# Patient Record
Sex: FEMALE | Race: WHITE
[De-identification: names, ages, dates, MRNs, and addresses within clinical notes are randomized per-mention and may not be internally consistent; named-entity substitution may affect disease eponyms.]

---

## 2018-03-09 DIAGNOSIS — E28.2 PCOS (POLYCYSTIC OVARIAN SYNDROME): Primary | ICD-10-CM

## 2018-03-09 RX ORDER — METFORMIN HYDROCHLORIDE 500 MG/1
500 TABLET, EXTENDED RELEASE ORAL 2 TIMES DAILY WITH MEALS
Qty: 60 TABLET | Refills: 0 | Status: SHIPPED | OUTPATIENT
Start: 2018-03-09 | End: 2018-04-04 | Stop reason: SDUPTHER

## 2018-04-04 DIAGNOSIS — E28.2 PCOS (POLYCYSTIC OVARIAN SYNDROME): ICD-10-CM

## 2018-04-04 RX ORDER — METFORMIN HYDROCHLORIDE 500 MG/1
TABLET, EXTENDED RELEASE ORAL
Qty: 60 TABLET | Refills: 0 | Status: SHIPPED | OUTPATIENT
Start: 2018-04-04 | End: 2018-04-05 | Stop reason: SDUPTHER

## 2018-04-05 DIAGNOSIS — E28.2 PCOS (POLYCYSTIC OVARIAN SYNDROME): ICD-10-CM

## 2018-04-05 RX ORDER — METFORMIN HYDROCHLORIDE 500 MG/1
500 TABLET, EXTENDED RELEASE ORAL 2 TIMES DAILY WITH MEALS
Qty: 60 TABLET | Refills: 0 | Status: SHIPPED | OUTPATIENT
Start: 2018-04-05 | End: 2018-05-04 | Stop reason: SDUPTHER

## 2018-05-04 DIAGNOSIS — E28.2 PCOS (POLYCYSTIC OVARIAN SYNDROME): ICD-10-CM

## 2018-05-04 RX ORDER — METFORMIN HYDROCHLORIDE 500 MG/1
500 TABLET, EXTENDED RELEASE ORAL 2 TIMES DAILY WITH MEALS
Qty: 180 TABLET | Refills: 3 | Status: SHIPPED | OUTPATIENT
Start: 2018-05-04 | End: 2019-01-07 | Stop reason: SDUPTHER

## 2018-05-04 NOTE — TELEPHONE ENCOUNTER
Previous Brianna pt has appt with you in January  She is asking if she can have her metformin with refills to last her till her appt?

## 2018-08-06 ENCOUNTER — TRANSCRIBE ORDERS (OUTPATIENT)
Dept: SCHEDULING | Age: 33
End: 2018-08-06

## 2018-08-06 DIAGNOSIS — R19.7 DIARRHEA: Primary | ICD-10-CM

## 2018-08-17 ENCOUNTER — HOSPITAL ENCOUNTER (OUTPATIENT)
Dept: RADIOLOGY | Age: 33
Discharge: HOME | End: 2018-08-17
Attending: PHYSICIAN ASSISTANT
Payer: COMMERCIAL

## 2018-08-17 DIAGNOSIS — R19.7 DIARRHEA: ICD-10-CM

## 2018-08-17 PROCEDURE — 76700 US EXAM ABDOM COMPLETE: CPT

## 2018-09-14 ENCOUNTER — TELEPHONE (OUTPATIENT)
Dept: ENDOCRINOLOGY | Facility: HOSPITAL | Age: 33
End: 2018-09-14

## 2018-09-14 NOTE — TELEPHONE ENCOUNTER
Patient has been seeing gi for vomiting and diarrhea  They would like to stop her metformin to see if this helps, is that ok?  pls advise

## 2018-11-19 ENCOUNTER — TRANSCRIBE ORDERS (OUTPATIENT)
Dept: REGISTRATION | Age: 33
End: 2018-11-19

## 2018-11-19 DIAGNOSIS — K58.0 IRRITABLE BOWEL SYNDROME WITH DIARRHEA: ICD-10-CM

## 2018-11-19 DIAGNOSIS — R11.2 NAUSEA WITH VOMITING: Primary | ICD-10-CM

## 2018-12-19 ENCOUNTER — TRANSCRIBE ORDERS (OUTPATIENT)
Dept: SCHEDULING | Age: 33
End: 2018-12-19

## 2018-12-19 DIAGNOSIS — R11.2 NAUSEA WITH VOMITING: Primary | ICD-10-CM

## 2018-12-19 LAB
ALBUMIN SERPL-MCNC: 4.5 G/DL (ref 3.6–5.1)
ALBUMIN/GLOB SERPL: 1.8 (CALC) (ref 1–2.5)
ALP SERPL-CCNC: 78 U/L (ref 33–115)
ALT SERPL-CCNC: 24 U/L (ref 6–29)
AST SERPL-CCNC: 17 U/L (ref 10–30)
BILIRUB SERPL-MCNC: 0.4 MG/DL (ref 0.2–1.2)
BUN SERPL-MCNC: 9 MG/DL (ref 7–25)
BUN/CREAT SERPL: NORMAL (CALC) (ref 6–22)
CALCIUM SERPL-MCNC: 9.6 MG/DL (ref 8.6–10.2)
CHLORIDE SERPL-SCNC: 108 MMOL/L (ref 98–110)
CHOLEST SERPL-MCNC: 143 MG/DL
CHOLEST/HDLC SERPL: 4.8 (CALC)
CO2 SERPL-SCNC: 22 MMOL/L (ref 20–32)
CREAT SERPL-MCNC: 0.86 MG/DL (ref 0.5–1.1)
GLOBULIN SER CALC-MCNC: 2.5 G/DL (CALC) (ref 1.9–3.7)
GLUCOSE SERPL-MCNC: 96 MG/DL (ref 65–99)
HBA1C MFR BLD: 5.2 % OF TOTAL HGB
HDLC SERPL-MCNC: 30 MG/DL
INSULIN SERPL-ACNC: 28 UIU/ML (ref 2–19.6)
LDLC SERPL CALC-MCNC: 75 MG/DL (CALC)
NONHDLC SERPL-MCNC: 113 MG/DL (CALC)
POTASSIUM SERPL-SCNC: 4.1 MMOL/L (ref 3.5–5.3)
PROT SERPL-MCNC: 7 G/DL (ref 6.1–8.1)
SL AMB EGFR AFRICAN AMERICAN: 103 ML/MIN/1.73M2
SL AMB EGFR NON AFRICAN AMERICAN: 89 ML/MIN/1.73M2
SODIUM SERPL-SCNC: 141 MMOL/L (ref 135–146)
T4 FREE SERPL-MCNC: 1.2 NG/DL (ref 0.8–1.8)
TESTOST FREE SERPL-MCNC: 3.5 PG/ML
TESTOST SERPL-MCNC: 23 NG/DL
TRIGL SERPL-MCNC: 289 MG/DL
TSH SERPL-ACNC: 2.33 MIU/L

## 2018-12-24 ENCOUNTER — HOSPITAL ENCOUNTER (OUTPATIENT)
Dept: RADIOLOGY | Age: 33
Discharge: HOME | End: 2018-12-24
Attending: PHYSICIAN ASSISTANT
Payer: COMMERCIAL

## 2018-12-24 ENCOUNTER — TRANSCRIBE ORDERS (OUTPATIENT)
Dept: REGISTRATION | Age: 33
End: 2018-12-24

## 2018-12-24 DIAGNOSIS — R11.2 NAUSEA WITH VOMITING: ICD-10-CM

## 2018-12-24 DIAGNOSIS — R11.2 NAUSEA WITH VOMITING: Primary | ICD-10-CM

## 2018-12-24 PROCEDURE — 63600105 HC IODINE BASED CONTRAST: Performed by: PHYSICIAN ASSISTANT

## 2018-12-24 PROCEDURE — 70470 CT HEAD/BRAIN W/O & W/DYE: CPT

## 2018-12-24 RX ADMIN — IOHEXOL 100 ML: 350 INJECTION, SOLUTION INTRAVENOUS at 10:37

## 2019-01-07 ENCOUNTER — OFFICE VISIT (OUTPATIENT)
Dept: ENDOCRINOLOGY | Facility: HOSPITAL | Age: 34
End: 2019-01-07
Payer: COMMERCIAL

## 2019-01-07 VITALS
WEIGHT: 238.8 LBS | HEIGHT: 67 IN | HEART RATE: 80 BPM | BODY MASS INDEX: 37.48 KG/M2 | DIASTOLIC BLOOD PRESSURE: 86 MMHG | SYSTOLIC BLOOD PRESSURE: 138 MMHG

## 2019-01-07 DIAGNOSIS — L68.0 HIRSUTISM: ICD-10-CM

## 2019-01-07 DIAGNOSIS — E78.1 HYPERTRIGLYCERIDEMIA: ICD-10-CM

## 2019-01-07 DIAGNOSIS — E28.2 PCOS (POLYCYSTIC OVARIAN SYNDROME): Primary | ICD-10-CM

## 2019-01-07 DIAGNOSIS — E66.09 CLASS 2 OBESITY DUE TO EXCESS CALORIES WITHOUT SERIOUS COMORBIDITY WITH BODY MASS INDEX (BMI) OF 37.0 TO 37.9 IN ADULT: ICD-10-CM

## 2019-01-07 PROBLEM — E66.812 CLASS 2 OBESITY DUE TO EXCESS CALORIES WITHOUT SERIOUS COMORBIDITY WITH BODY MASS INDEX (BMI) OF 37.0 TO 37.9 IN ADULT: Status: ACTIVE | Noted: 2019-01-07

## 2019-01-07 PROBLEM — L70.8 OTHER ACNE: Status: ACTIVE | Noted: 2019-01-07

## 2019-01-07 PROCEDURE — 99214 OFFICE O/P EST MOD 30 MIN: CPT | Performed by: INTERNAL MEDICINE

## 2019-01-07 RX ORDER — OXCARBAZEPINE 300 MG/1
300 TABLET, FILM COATED ORAL
COMMUNITY
End: 2019-07-08 | Stop reason: ALTCHOICE

## 2019-01-07 RX ORDER — ALUMINUM ZIRCONIUM OCTACHLOROHYDREX GLY 16 G/100G
1 GEL TOPICAL DAILY
COMMUNITY
End: 2020-07-13 | Stop reason: ALTCHOICE

## 2019-01-07 RX ORDER — ASCORBIC ACID 125 MG
2 TABLET,CHEWABLE ORAL DAILY
COMMUNITY
End: 2020-07-13 | Stop reason: ALTCHOICE

## 2019-01-07 RX ORDER — RANITIDINE 300 MG/1
300 CAPSULE ORAL
COMMUNITY
End: 2020-07-13 | Stop reason: ALTCHOICE

## 2019-01-07 RX ORDER — CARIPRAZINE 6 MG/1
6 CAPSULE, GELATIN COATED ORAL DAILY
COMMUNITY
Start: 2019-01-02

## 2019-01-07 RX ORDER — PROPRANOLOL HYDROCHLORIDE 10 MG/1
10 TABLET ORAL AS NEEDED
COMMUNITY

## 2019-01-07 RX ORDER — ASCORBIC ACID 1000 MG
2 TABLET ORAL
COMMUNITY
End: 2019-07-08 | Stop reason: ALTCHOICE

## 2019-01-07 RX ORDER — DEXLANSOPRAZOLE 60 MG/1
60 CAPSULE, DELAYED RELEASE ORAL
COMMUNITY
End: 2021-09-27 | Stop reason: ALTCHOICE

## 2019-01-07 RX ORDER — LITHIUM CARBONATE 450 MG
450 TABLET, EXTENDED RELEASE ORAL
COMMUNITY

## 2019-01-07 RX ORDER — METFORMIN HYDROCHLORIDE 500 MG/1
500 TABLET, EXTENDED RELEASE ORAL 3 TIMES DAILY
Qty: 270 TABLET | Refills: 3 | Status: SHIPPED | OUTPATIENT
Start: 2019-01-07 | End: 2019-07-08 | Stop reason: SDUPTHER

## 2019-01-07 RX ORDER — LORAZEPAM 1 MG/1
1 TABLET ORAL
COMMUNITY

## 2019-01-07 NOTE — PATIENT INSTRUCTIONS
Given the high insulin level, you can increase the 3 metformin XR 5000 mg daily  If the stomach symptoms worsen, go back down to 2 tablets daily  Continue to exercise  Work on cutting down the sweets and sugars and carbohydrates/starches  Consider a sleep study evaluation  Follow up in 6 months with blood work

## 2019-01-07 NOTE — PROGRESS NOTES
1/7/2019    Assessment/Plan      Diagnoses and all orders for this visit:    PCOS (polycystic ovarian syndrome)  -     Comprehensive metabolic panel Lab Collect; Future  -     Insulin, fasting; Future  -     metFORMIN (GLUCOPHAGE-XR) 500 mg 24 hr tablet; Take 1 tablet (500 mg total) by mouth 3 (three) times a day    Hirsutism  -     Comprehensive metabolic panel Lab Collect; Future  -     Insulin, fasting; Future    Class 2 obesity due to excess calories without serious comorbidity with body mass index (BMI) of 37 0 to 37 9 in adult    Hypertriglyceridemia    Other orders  -     dexlansoprazole (DEXILANT) 60 MG capsule; Take 60 mg by mouth  -     lithium carbonate (LITHOBID) 450 mg CR tablet; Take 450 mg by mouth 2 (two) times a day    -     VRAYLAR 6 MG capsule; 6 mg daily    -     OXcarbazepine (TRILEPTAL) 300 mg tablet; Take 300 mg by mouth  -     ranitidine (ZANTAC) 300 MG capsule; Take 300 mg by mouth  -     LORazepam (ATIVAN) 1 mg tablet; Take 1 mg by mouth  -     propranolol (INDERAL) 10 mg tablet; Take 10 mg by mouth 3 (three) times a day Prn anxiety disorder   -     Multiple Vitamins-Minerals (MULTI ADULT GUMMIES) CHEW; Chew 2 each daily  -     Charcoal 200 MG CAPS; Take 2 each by mouth daily at bedtime  -     bifidobacterium infantis (ALIGN) capsule; Take 1 capsule by mouth daily        Assessment/Plan:  1  Polycystic ovary syndrome  Based on her blood work, she has had an elevated insulin level  I will increase her metformin  mg to 3 tablets daily  She was warned that could increase her diarrhea and GI upset  If her GI symptoms worsen, she has to go back down to 2 tablets daily  Hopefully this will also help her lose weight  2   Obesity  Her weight has gone up since last year  She has just started exercising over the last week  I educated her and strongly encouraged her to cut out the sweets and sugared foods and to control her carbohydrate portions to help her lose weight    3  Hypertriglyceridemia  Her triglycerides are quite high with a low HDL which is consistent with polycystic ovary syndrome and insulin resistance along with metabolic syndrome  Losing weight cutting out the sweets may help these triglycerides  I have asked her to follow up in 6 months with fasting CMP and insulin level  CC:   Polycystic ovary syndrome follow-up    History of Present Illness     HPI: Harrison Pascual is a 35y o  year old female with history of polycystic ovary syndrome with hirsutism acne  She was 1st diagnosed with polycystic ovary syndrome about 4 or more years ago  She is currently on metformin  mg 2 tablets daily  She was on 3 tablets daily 1 year ago and it is unclear when that dose was decreased  She did try going off her metformin for at least a week and there was no change in her current diarrhea or vomiting and she has since been diagnosed with gastric paresis  She was also on spironolactone 1 year ago for hirsutism but has not been using it since her hirsutism is not too bad  She is though experiencing some hair loss and thinning on top of her head  Her acne is stable  Menstrual cycles do occur on a regular monthly basis and she is only skipped 1 menses within the last 6 months  Unfortunately, weight has gone up over 10 lb since last year  She just started exercising again over the last week and admits that she is a sweet eater  She has no polyuria polyphagia or nocturia but has some polydipsia with her medications  She has no extremity numbness or tingling or blurry vision  Review of Systems   Constitutional: Positive for fatigue and unexpected weight change  Says she will going back to weight and workout at home just started to lose weight last week  No so good with limiting sugars and sweets  She is better with no late night eating past 6 pm  She reports 226 lbs 4 weeks ago  HENT: Negative for hearing loss, tinnitus and trouble swallowing      Eyes: Negative for visual disturbance  Respiratory: Negative for chest tightness and shortness of breath  Cardiovascular: Positive for palpitations  Negative for chest pain and leg swelling  Occasional heart palpitations  Gastrointestinal: Positive for diarrhea  Negative for abdominal pain, constipation and nausea  Vomiting and diarrhea  No change off metformin for 1 week so back on  Diagnosed with gatric paresis  Endocrine: Positive for polydipsia  Negative for polyphagia and polyuria  A lot water due to thirst and health  No nocturia  Genitourinary:        Menses regular on a monthly basis  Skipped 1 within the last 6 months  Flow heavy  Musculoskeletal: Positive for myalgias  Negative for arthralgias and back pain  Skin: Negative for wound  Hirsutism rare  Losing more hair on head recently  a few months ago  She stopped spironalactone about 1 year ago  No significant acne  Neurological: Positive for dizziness  Negative for tremors, light-headedness, numbness and headaches  Very dizzy when bend over and get back up    Psychiatric/Behavioral: Positive for dysphoric mood  Negative for sleep disturbance  The patient is nervous/anxious  Mother reports she snores loudly  Bipolar/anxiety stable  Grandmother  this year         Historical Information   Past Medical History:   Diagnosis Date    Asthma     childhood    Bipolar 1 disorder (HCC)     Fibromyalgia     Gastric paresis     Generalized anxiety disorder     GERD (gastroesophageal reflux disease)     Hypertriglyceridemia     low HDL    OCD (obsessive compulsive disorder)      Past Surgical History:   Procedure Laterality Date    SINUS SURGERY      WISDOM TOOTH EXTRACTION       Social History   History   Alcohol Use No     History   Drug Use No     History   Smoking Status    Never Smoker   Smokeless Tobacco    Never Used     Family History:   Family History   Problem Relation Age of Onset    No Known Problems Mother     Lymphoma Father         NON HODGEKINS       Meds/Allergies   Current Outpatient Prescriptions   Medication Sig Dispense Refill    bifidobacterium infantis (ALIGN) capsule Take 1 capsule by mouth daily      Charcoal 200 MG CAPS Take 2 each by mouth daily at bedtime      dexlansoprazole (DEXILANT) 60 MG capsule Take 60 mg by mouth      lithium carbonate (LITHOBID) 450 mg CR tablet Take 450 mg by mouth 2 (two) times a day        LORazepam (ATIVAN) 1 mg tablet Take 1 mg by mouth      metFORMIN (GLUCOPHAGE-XR) 500 mg 24 hr tablet Take 1 tablet (500 mg total) by mouth 3 (three) times a day 270 tablet 3    Multiple Vitamins-Minerals (MULTI ADULT GUMMIES) CHEW Chew 2 each daily      OXcarbazepine (TRILEPTAL) 300 mg tablet Take 300 mg by mouth      propranolol (INDERAL) 10 mg tablet Take 10 mg by mouth 3 (three) times a day Prn anxiety disorder       ranitidine (ZANTAC) 300 MG capsule Take 300 mg by mouth      VRAYLAR 6 MG capsule 6 mg daily         No current facility-administered medications for this visit  No Known Allergies    Objective   Vitals: Blood pressure 138/86, pulse 80, height 5' 7" (1 702 m), weight 108 kg (238 lb 12 8 oz)  Invasive Devices          No matching active lines, drains, or airways          Physical Exam   Constitutional: She is oriented to person, place, and time  She appears well-developed and well-nourished  HENT:   Head: Normocephalic and atraumatic  No moon faces  Eyes: Pupils are equal, round, and reactive to light  Conjunctivae and EOM are normal    No lid lag, stare, proptosis, or periorbital edema  Neck: Normal range of motion  Neck supple  No thyromegaly present  Thyroid normal in size  No palpable thyroid nodules  No carotid bruits  No supraclavicular fat pads or buffalo hump  Cardiovascular: Normal rate, regular rhythm, normal heart sounds and intact distal pulses  No murmur heard    Pulmonary/Chest: Effort normal and breath sounds normal  She has no wheezes  Abdominal: Soft  Bowel sounds are normal  There is no tenderness  Musculoskeletal: Normal range of motion  She exhibits no edema or deformity  No tremor of the outstretched hands  No spinous process tenderness  No CVA tenderness  Lymphadenopathy:     She has no cervical adenopathy  Neurological: She is alert and oriented to person, place, and time  She has normal reflexes  Skin: Skin is warm and dry  No rash noted  No significant hirsutism  Some acne noted  Some visible thinning on the vertex of her head  Vitals reviewed  The history was obtained from the review of the chart and from the patient and mother  Lab Results:    Hemoglobin A1c was normal at 5 2%  CMP shows a fasting glucose of 96 with a BUN of 9 and creatinine of 0 86  The rest of the CMP was normal  Insulin level fasting was elevated at twenty-eight  Lab Results   Component Value Date    BUN 9 12/19/2018    K 4 1 12/19/2018     12/19/2018    CO2 22 12/19/2018     Total cholesterol 143, LDL 75   Lab Results   Component Value Date    HDL 30 (L) 12/19/2018    TRIG 289 (H) 12/19/2018       Lab Results   Component Value Date    ALKPHOS 78 12/19/2018       Lab Results   Component Value Date    TSH 2 33 12/19/2018    FREET4 1 2 12/19/2018    Total Testosterone of 23      Future Appointments  Date Time Provider Colette Clark   7/8/2019 1:00 PM Nadya Alcantar MD [facility]

## 2019-01-07 NOTE — LETTER
January 7, 2019     [facility]  [address]  [address] [place] [id number]    Patient: Yehuda Mcgarry   YOB: 1985   Date of Visit: 1/7/2019       Dear Dr Bianca Hope: Thank you for referring Yehuda Mcgarry to me for evaluation  Below are my notes for this consultation  If you have questions, please do not hesitate to call me  I look forward to following your patient along with you  Sincerely,        Jayme Mcknight MD        CC: No Recipients  Jayme Mcknight MD  1/7/2019  2:12 PM  Sign at close encounter  1/7/2019    Assessment/Plan      Diagnoses and all orders for this visit:    PCOS (polycystic ovarian syndrome)  -     Comprehensive metabolic panel Lab Collect; Future  -     Insulin, fasting; Future  -     metFORMIN (GLUCOPHAGE-XR) 500 mg 24 hr tablet; Take 1 tablet (500 mg total) by mouth 3 (three) times a day    Hirsutism  -     Comprehensive metabolic panel Lab Collect; Future  -     Insulin, fasting; Future    Class 2 obesity due to excess calories without serious comorbidity with body mass index (BMI) of 37 0 to 37 9 in adult    Hypertriglyceridemia    Other orders  -     dexlansoprazole (DEXILANT) 60 MG capsule; Take 60 mg by mouth  -     lithium carbonate (LITHOBID) 450 mg CR tablet; Take 450 mg by mouth 2 (two) times a day    -     VRAYLAR 6 MG capsule; 6 mg daily    -     OXcarbazepine (TRILEPTAL) 300 mg tablet; Take 300 mg by mouth  -     ranitidine (ZANTAC) 300 MG capsule; Take 300 mg by mouth  -     LORazepam (ATIVAN) 1 mg tablet; Take 1 mg by mouth  -     propranolol (INDERAL) 10 mg tablet; Take 10 mg by mouth 3 (three) times a day Prn anxiety disorder   -     Multiple Vitamins-Minerals (MULTI ADULT GUMMIES) CHEW; Chew 2 each daily  -     Charcoal 200 MG CAPS; Take 2 each by mouth daily at bedtime  -     bifidobacterium infantis (ALIGN) capsule; Take 1 capsule by mouth daily        Assessment/Plan:  1  Polycystic ovary syndrome    Based on her blood work, she has had an elevated insulin level  I will increase her metformin  mg to 3 tablets daily  She was warned that could increase her diarrhea and GI upset  If her GI symptoms worsen, she has to go back down to 2 tablets daily  Hopefully this will also help her lose weight  2   Obesity  Her weight has gone up since last year  She has just started exercising over the last week  I educated her and strongly encouraged her to cut out the sweets and sugared foods and to control her carbohydrate portions to help her lose weight  3   Hypertriglyceridemia  Her triglycerides are quite high with a low HDL which is consistent with polycystic ovary syndrome and insulin resistance along with metabolic syndrome  Losing weight cutting out the sweets may help these triglycerides  I have asked her to follow up in 6 months with fasting CMP and insulin level  CC:   Polycystic ovary syndrome follow-up    History of Present Illness     HPI: Dakotah Mroley is a 35y o  year old female with history of polycystic ovary syndrome with hirsutism acne  She was 1st diagnosed with polycystic ovary syndrome about 4 or more years ago  She is currently on metformin  mg 2 tablets daily  She was on 3 tablets daily 1 year ago and it is unclear when that dose was decreased  She did try going off her metformin for at least a week and there was no change in her current diarrhea or vomiting and she has since been diagnosed with gastric paresis  She was also on spironolactone 1 year ago for hirsutism but has not been using it since her hirsutism is not too bad  She is though experiencing some hair loss and thinning on top of her head  Her acne is stable  Menstrual cycles do occur on a regular monthly basis and she is only skipped 1 menses within the last 6 months  Unfortunately, weight has gone up over 10 lb since last year  She just started exercising again over the last week and admits that she is a sweet eater  She has no polyuria polyphagia or nocturia but has some polydipsia with her medications  She has no extremity numbness or tingling or blurry vision  Review of Systems   Constitutional: Positive for fatigue and unexpected weight change  Says she will going back to weight and workout at home just started to lose weight last week  No so good with limiting sugars and sweets  She is better with no late night eating past 6 pm  She reports 226 lbs 4 weeks ago  HENT: Negative for hearing loss, tinnitus and trouble swallowing  Eyes: Negative for visual disturbance  Respiratory: Negative for chest tightness and shortness of breath  Cardiovascular: Positive for palpitations  Negative for chest pain and leg swelling  Occasional heart palpitations  Gastrointestinal: Positive for diarrhea  Negative for abdominal pain, constipation and nausea  Vomiting and diarrhea  No change off metformin for 1 week so back on  Diagnosed with gatric paresis  Endocrine: Positive for polydipsia  Negative for polyphagia and polyuria  A lot water due to thirst and health  No nocturia  Genitourinary:        Menses regular on a monthly basis  Skipped 1 within the last 6 months  Flow heavy  Musculoskeletal: Positive for myalgias  Negative for arthralgias and back pain  Skin: Negative for wound  Hirsutism rare  Losing more hair on head recently  a few months ago  She stopped spironalactone about 1 year ago  No significant acne  Neurological: Positive for dizziness  Negative for tremors, light-headedness, numbness and headaches  Very dizzy when bend over and get back up    Psychiatric/Behavioral: Positive for dysphoric mood  Negative for sleep disturbance  The patient is nervous/anxious  Mother reports she snores loudly  Bipolar/anxiety stable  Grandmother  this year         Historical Information   Past Medical History:   Diagnosis Date    Asthma     childhood    Bipolar 1 disorder ([facility] [address] )     Fibromyalgia     Gastric paresis     Generalized anxiety disorder     GERD (gastroesophageal reflux disease)     Hypertriglyceridemia     low HDL    OCD (obsessive compulsive disorder)      Past Surgical History:   Procedure Laterality Date    SINUS SURGERY      WISDOM TOOTH EXTRACTION       Social History   History   Alcohol Use No     History   Drug Use No     History   Smoking Status    Never Smoker   Smokeless Tobacco    Never Used     Family History:   Family History   Problem Relation Age of Onset    No Known Problems Mother     Lymphoma Father         NON HODGEKINS       Meds/Allergies   Current Outpatient Prescriptions   Medication Sig Dispense Refill    bifidobacterium infantis (ALIGN) capsule Take 1 capsule by mouth daily      Charcoal 200 MG CAPS Take 2 each by mouth daily at bedtime      dexlansoprazole (DEXILANT) 60 MG capsule Take 60 mg by mouth      lithium carbonate (LITHOBID) 450 mg CR tablet Take 450 mg by mouth 2 (two) times a day        LORazepam (ATIVAN) 1 mg tablet Take 1 mg by mouth      metFORMIN (GLUCOPHAGE-XR) 500 mg 24 hr tablet Take 1 tablet (500 mg total) by mouth 3 (three) times a day 270 tablet 3    Multiple Vitamins-Minerals (MULTI ADULT GUMMIES) CHEW Chew 2 each daily      OXcarbazepine (TRILEPTAL) 300 mg tablet Take 300 mg by mouth      propranolol (INDERAL) 10 mg tablet Take 10 mg by mouth 3 (three) times a day Prn anxiety disorder       ranitidine (ZANTAC) 300 MG capsule Take 300 mg by mouth      VRAYLAR 6 MG capsule 6 mg daily         No current facility-administered medications for this visit  No Known Allergies    Objective   Vitals: Blood pressure 138/86, pulse 80, height 5' 7" (1 702 m), weight 108 kg (238 lb 12 8 oz)  Invasive Devices          No matching active lines, drains, or airways          Physical Exam   Constitutional: She is oriented to person, place, and time  She appears well-developed and well-nourished     HENT: Head: Normocephalic and atraumatic  No moon faces  Eyes: Pupils are equal, round, and reactive to light  Conjunctivae and EOM are normal    No lid lag, stare, proptosis, or periorbital edema  Neck: Normal range of motion  Neck supple  No thyromegaly present  Thyroid normal in size  No palpable thyroid nodules  No carotid bruits  No supraclavicular fat pads or buffalo hump  Cardiovascular: Normal rate, regular rhythm, normal heart sounds and intact distal pulses  No murmur heard  Pulmonary/Chest: Effort normal and breath sounds normal  She has no wheezes  Abdominal: Soft  Bowel sounds are normal  There is no tenderness  Musculoskeletal: Normal range of motion  She exhibits no edema or deformity  No tremor of the outstretched hands  No spinous process tenderness  No CVA tenderness  Lymphadenopathy:     She has no cervical adenopathy  Neurological: She is alert and oriented to person, place, and time  She has normal reflexes  Skin: Skin is warm and dry  No rash noted  No significant hirsutism  Some acne noted  Some visible thinning on the vertex of her head  Vitals reviewed  The history was obtained from the review of the chart and from the patient and mother  Lab Results:    Hemoglobin A1c was normal at 5 2%  CMP shows a fasting glucose of 96 with a BUN of 9 and creatinine of 0 86  The rest of the CMP was normal  Insulin level fasting was elevated at twenty-eight  Lab Results   Component Value Date    BUN 9 12/19/2018    K 4 1 12/19/2018     12/19/2018    CO2 22 12/19/2018     Total cholesterol 143, LDL 75   Lab Results   Component Value Date    HDL 30 (L) 12/19/2018    TRIG 289 (H) 12/19/2018       Lab Results   Component Value Date    ALKPHOS 78 12/19/2018       Lab Results   Component Value Date    TSH 2 33 12/19/2018    FREET4 1 2 12/19/2018    Total Testosterone of 23      Future Appointments  Date Time Provider Colette Clark   7/8/2019 1:00 PM Ildefonso Collins Meliza Smith, [address] [facility]

## 2019-06-12 LAB
ALBUMIN SERPL-MCNC: 4.6 G/DL (ref 3.6–5.1)
ALBUMIN/GLOB SERPL: 1.9 (CALC) (ref 1–2.5)
ALP SERPL-CCNC: 78 U/L (ref 33–115)
ALT SERPL-CCNC: 24 U/L (ref 6–29)
AST SERPL-CCNC: 15 U/L (ref 10–30)
BILIRUB SERPL-MCNC: 0.3 MG/DL (ref 0.2–1.2)
BUN SERPL-MCNC: 11 MG/DL (ref 7–25)
BUN/CREAT SERPL: NORMAL (CALC) (ref 6–22)
CALCIUM SERPL-MCNC: 9.4 MG/DL (ref 8.6–10.2)
CHLORIDE SERPL-SCNC: 106 MMOL/L (ref 98–110)
CO2 SERPL-SCNC: 23 MMOL/L (ref 20–32)
CREAT SERPL-MCNC: 0.99 MG/DL (ref 0.5–1.1)
GLOBULIN SER CALC-MCNC: 2.4 G/DL (CALC) (ref 1.9–3.7)
GLUCOSE SERPL-MCNC: 89 MG/DL (ref 65–99)
INSULIN SERPL-ACNC: 25.6 UIU/ML (ref 2–19.6)
INSULIN SERPL-ACNC: 26.5 UIU/ML (ref 2–19.6)
POTASSIUM SERPL-SCNC: 4.3 MMOL/L (ref 3.5–5.3)
PROT SERPL-MCNC: 7 G/DL (ref 6.1–8.1)
SL AMB EGFR AFRICAN AMERICAN: 87 ML/MIN/1.73M2
SL AMB EGFR NON AFRICAN AMERICAN: 75 ML/MIN/1.73M2
SODIUM SERPL-SCNC: 140 MMOL/L (ref 135–146)

## 2019-07-08 ENCOUNTER — OFFICE VISIT (OUTPATIENT)
Dept: ENDOCRINOLOGY | Facility: HOSPITAL | Age: 34
End: 2019-07-08
Payer: COMMERCIAL

## 2019-07-08 VITALS
HEIGHT: 67 IN | BODY MASS INDEX: 36.38 KG/M2 | HEART RATE: 84 BPM | WEIGHT: 231.8 LBS | SYSTOLIC BLOOD PRESSURE: 130 MMHG | DIASTOLIC BLOOD PRESSURE: 86 MMHG

## 2019-07-08 DIAGNOSIS — E66.09 CLASS 2 OBESITY DUE TO EXCESS CALORIES WITHOUT SERIOUS COMORBIDITY WITH BODY MASS INDEX (BMI) OF 37.0 TO 37.9 IN ADULT: ICD-10-CM

## 2019-07-08 DIAGNOSIS — L68.0 HIRSUTISM: ICD-10-CM

## 2019-07-08 DIAGNOSIS — E78.1 HYPERTRIGLYCERIDEMIA: ICD-10-CM

## 2019-07-08 DIAGNOSIS — E28.2 PCOS (POLYCYSTIC OVARIAN SYNDROME): Primary | ICD-10-CM

## 2019-07-08 DIAGNOSIS — L70.8 OTHER ACNE: ICD-10-CM

## 2019-07-08 PROCEDURE — 99214 OFFICE O/P EST MOD 30 MIN: CPT | Performed by: INTERNAL MEDICINE

## 2019-07-08 RX ORDER — METFORMIN HYDROCHLORIDE 500 MG/1
500 TABLET, EXTENDED RELEASE ORAL 3 TIMES DAILY
Qty: 270 TABLET | Refills: 3 | Status: SHIPPED | OUTPATIENT
Start: 2019-07-08 | End: 2020-07-13 | Stop reason: SDUPTHER

## 2019-07-08 RX ORDER — CYCLOBENZAPRINE HCL 10 MG
10 TABLET ORAL 3 TIMES DAILY PRN
COMMUNITY
End: 2020-07-13 | Stop reason: ALTCHOICE

## 2019-07-08 NOTE — PATIENT INSTRUCTIONS
Liver, kidney function and blood sugar normal   The insulin level is still high  Insulin level  can come down with weight loss  cetaphil foaming facial wash for acne prone skin  Continue the same twice daily metformin      Make sure to hold biotin for 1 week before blood tests  Follow up in 1 year with blood work

## 2019-07-08 NOTE — PROGRESS NOTES
7/8/2019    Assessment/Plan      Diagnoses and all orders for this visit:    PCOS (polycystic ovarian syndrome)  -     HEMOGLOBIN A1C W/ EAG ESTIMATION Lab Collect; Future  -     Comprehensive metabolic panel Lab Collect; Future  -     TSH, 3rd generation Lab Collect; Future  -     Testosterone, free, total Lab Collect; Future  -     Insulin, fasting- Lab Collect; Future  -     metFORMIN (GLUCOPHAGE-XR) 500 mg 24 hr tablet; Take 1 tablet (500 mg total) by mouth 3 (three) times a day    Hypertriglyceridemia  -     HEMOGLOBIN A1C W/ EAG ESTIMATION Lab Collect; Future  -     Comprehensive metabolic panel Lab Collect; Future  -     TSH, 3rd generation Lab Collect; Future  -     Testosterone, free, total Lab Collect; Future  -     Insulin, fasting- Lab Collect; Future    Hirsutism  -     HEMOGLOBIN A1C W/ EAG ESTIMATION Lab Collect; Future  -     Comprehensive metabolic panel Lab Collect; Future  -     TSH, 3rd generation Lab Collect; Future  -     Testosterone, free, total Lab Collect; Future  -     Insulin, fasting- Lab Collect; Future    Class 2 obesity due to excess calories without serious comorbidity with body mass index (BMI) of 37 0 to 37 9 in adult  -     HEMOGLOBIN A1C W/ EAG ESTIMATION Lab Collect; Future  -     Comprehensive metabolic panel Lab Collect; Future  -     TSH, 3rd generation Lab Collect; Future  -     Testosterone, free, total Lab Collect; Future  -     Insulin, fasting- Lab Collect; Future    Other acne  -     HEMOGLOBIN A1C W/ EAG ESTIMATION Lab Collect; Future  -     Comprehensive metabolic panel Lab Collect; Future  -     TSH, 3rd generation Lab Collect; Future  -     Testosterone, free, total Lab Collect; Future  -     Insulin, fasting- Lab Collect; Future    Other orders  -     cyclobenzaprine (FLEXERIL) 10 mg tablet; Take 10 mg by mouth 3 (three) times a day as needed for muscle spasms  -     Biotin 55732 MCG TBDP; Take by mouth daily        Assessment/Plan:  1  PCOS   Insulin level is still high  Liver, renal, and blood sugars are normal  She will continue the same metformin twice a day and can try increasing to 3 times a day as able  2  Hirsutism  She is not interested I treating this for now  3  Acne  She can try cetaphil foaming wash  4  Obesity  She will continue to work on diet, exercise, and weight loss  I have asked her to follow up in 1 year with preceding hgba1c, CMP, fasting insulin level, TSH, and free and total testosterone levels  CC: PCOS follow up    History of Present Illness     HPI: Clora Nissen is a 35y o  year old female with history of   Polycystic ovary syndrome with hypertriglyceridemia and hirsutism along with obesity  She was 1st diagnosed with polycystic ovary syndrome over 4 years ago  She is on metformin  She has been on spironolactone for hirsutism also in the past     She takes metformin  mg 2 tablets daily  Has not been able to tolerate 3 metformins daily, still needs antidiarrheal on 2 tablets daily  Has now been diagnosed with gastroparesis  She had a lot of am vomiting  She has some polydipsia but no polyuria  She has no blurry vision of extremity paresthesias  She has stable hirsutism and worsening cystic acne  Weight is 7 lbs more than last visit  Menses occur in a regular monthly basis but menses was skipped this past  Month  Review of Systems   Constitutional: Positive for fatigue  Negative for unexpected weight change  More fatigued with the fibromyalgia  7 lbs less than last visit  HENT: Negative for trouble swallowing  Eyes: Negative for visual disturbance  Wears glasses  Respiratory: Negative for chest tightness and shortness of breath  Cardiovascular: Negative for chest pain and palpitations  Gastrointestinal: Positive for abdominal pain, diarrhea and vomiting  Negative for constipation and nausea  Endocrine: Positive for polydipsia  Negative for polyuria  Always thirsty     Genitourinary: Menses regular on a monthly basis  Skipped menses this month  Musculoskeletal: Positive for myalgias  Fibromyalgia worse with weather  Skin:        Hirsutism stable  Cystic acne worse and jaw line skin fara and worse  Has been struggling with angular chelitis for 1 year  She is losing alot more  Thinning on top  She is trying biotin  Neurological: Positive for tremors  Negative for dizziness, light-headedness, numbness and headaches  Having left arm tremors  Psychiatric/Behavioral: Negative for dysphoric mood and sleep disturbance  The patient is nervous/anxious  Always a bit anxious         Historical Information   Past Medical History:   Diagnosis Date    Asthma     childhood    Bipolar 1 disorder (HCC)     Fibromyalgia     Gastric paresis     Gastroparesis     Generalized anxiety disorder     GERD (gastroesophageal reflux disease)     Hypertriglyceridemia     low HDL    OCD (obsessive compulsive disorder)      Past Surgical History:   Procedure Laterality Date    SINUS SURGERY      WISDOM TOOTH EXTRACTION       Social History   Social History     Substance and Sexual Activity   Alcohol Use No     Social History     Substance and Sexual Activity   Drug Use No     Social History     Tobacco Use   Smoking Status Never Smoker   Smokeless Tobacco Never Used     Family History:   Family History   Problem Relation Age of Onset    No Known Problems Mother     Lymphoma Father         NON HODGEKINS       Meds/Allergies   Current Outpatient Medications   Medication Sig Dispense Refill    bifidobacterium infantis (ALIGN) capsule Take 1 capsule by mouth daily      Biotin 99524 MCG TBDP Take by mouth daily      cyclobenzaprine (FLEXERIL) 10 mg tablet Take 10 mg by mouth 3 (three) times a day as needed for muscle spasms      dexlansoprazole (DEXILANT) 60 MG capsule Take 60 mg by mouth      lithium carbonate (LITHOBID) 450 mg CR tablet Take 450 mg by mouth 2 (two) times a day  LORazepam (ATIVAN) 1 mg tablet Take 1 mg by mouth      metFORMIN (GLUCOPHAGE-XR) 500 mg 24 hr tablet Take 1 tablet (500 mg total) by mouth 3 (three) times a day 270 tablet 3    Multiple Vitamins-Minerals (MULTI ADULT GUMMIES) CHEW Chew 2 each daily      propranolol (INDERAL) 10 mg tablet Take 10 mg by mouth 3 (three) times a day Prn anxiety disorder       ranitidine (ZANTAC) 300 MG capsule Take 300 mg by mouth      VRAYLAR 6 MG capsule 6 mg daily         No current facility-administered medications for this visit  No Known Allergies    Objective   Vitals: Blood pressure 130/86, pulse 84, height 5' 7" (1 702 m), weight 105 kg (231 lb 12 8 oz)  Invasive Devices     None                 Physical Exam   Constitutional: She is oriented to person, place, and time  She appears well-developed and well-nourished  HENT:   Head: Normocephalic and atraumatic  Eyes: Pupils are equal, round, and reactive to light  Conjunctivae and EOM are normal    No lid lag, stare, proptosis, or periorbital edema  Neck: Normal range of motion  Neck supple  No thyromegaly present  Thyroid normal in size  No palpable thyroid nodules  No carotid bruits  Cardiovascular: Normal rate, regular rhythm and normal heart sounds  No murmur heard  Pulmonary/Chest: Effort normal and breath sounds normal  She has no wheezes  Abdominal: Soft  There is no tenderness  Musculoskeletal: Normal range of motion  She exhibits no edema or deformity  No tremor of the outstretched hands  Lymphadenopathy:     She has no cervical adenopathy  Neurological: She is alert and oriented to person, place, and time  She has normal reflexes  Skin: Skin is warm and dry  No rash noted  Acne on face  Vitals reviewed  The history was obtained from the review of the chart and from the patient      Lab Results:     CMP done on 6/11/19 showed a glucose of 89 fasting and was otherwise normal   Lab Results   Component Value Date CREATININE 0 99 06/11/2019    CREATININE 0 86 12/19/2018    BUN 11 06/11/2019    K 4 3 06/11/2019     06/11/2019    CO2 23 06/11/2019     Lab Results   Component Value Date    ALT 24 06/11/2019    AST 15 06/11/2019    ALKPHOS 78 06/11/2019     Insulin level was 26 5        Future Appointments   Date Time Provider Colette Chisholmi   7/13/2020  1:00 PM Dima Garibay MD [facility]

## 2019-12-16 ENCOUNTER — TELEPHONE (OUTPATIENT)
Dept: ENDOCRINOLOGY | Facility: HOSPITAL | Age: 34
End: 2019-12-16

## 2019-12-16 NOTE — TELEPHONE ENCOUNTER
Patient just had her annual PCP exam   She hasn't had a period for 2 months now  Her PCP recommended she let you know and see what your thoughts are on birth control  Would it be a problem with her PCOS? She has no OB/GYN  Her PCP takes care of everything  Please let patient know

## 2019-12-16 NOTE — TELEPHONE ENCOUNTER
It is fine to be on birth control pills for her polycystic ovary syndrome  This is actually a treatment for polycystic ovary syndrome to help regulate periods  It may also help her acne and hirsutism  I do not order that, her PCP can order that since she has been doing her GYN exams

## 2020-02-03 ENCOUNTER — TELEPHONE (OUTPATIENT)
Dept: ENDOCRINOLOGY | Facility: HOSPITAL | Age: 35
End: 2020-02-03

## 2020-02-03 NOTE — TELEPHONE ENCOUNTER
Patient aware, she does not want to try any injections and did note that she recently started taking birth control for her PCOS

## 2020-02-03 NOTE — TELEPHONE ENCOUNTER
Okay, I would agree with the discontinuation of metformin for now  There is nothing else definitely approved for insulin resistance other than metformin in a polycystic ovary syndrome patient  Some people are using off-label medications that her injections once a week such as ozempic  This may or may not be approved by the insurance companies

## 2020-02-03 NOTE — TELEPHONE ENCOUNTER
Pt has come to the decision that she can no longer take metformin due to the side effects  She said she has been battling stomach issues for years, including diarrhea that she attributes to the metformin  She stopped taking it yesterday  She knows the last time she went off her insulin levels became elevated  She is questioning if there is an alternative that will not have that side effect

## 2020-03-16 ENCOUNTER — TELEPHONE (OUTPATIENT)
Dept: ENDOCRINOLOGY | Facility: HOSPITAL | Age: 35
End: 2020-03-16

## 2020-03-16 DIAGNOSIS — E66.09 CLASS 2 OBESITY DUE TO EXCESS CALORIES WITHOUT SERIOUS COMORBIDITY WITH BODY MASS INDEX (BMI) OF 37.0 TO 37.9 IN ADULT: ICD-10-CM

## 2020-03-16 DIAGNOSIS — L68.0 HIRSUTISM: ICD-10-CM

## 2020-03-16 DIAGNOSIS — E28.2 PCOS (POLYCYSTIC OVARIAN SYNDROME): Primary | ICD-10-CM

## 2020-03-16 DIAGNOSIS — L70.8 OTHER ACNE: ICD-10-CM

## 2020-03-16 NOTE — TELEPHONE ENCOUNTER
Pt went off metformin a month ago and started taking cinnamon as an alternative  Patient would like a lab slip to check her levels since she's been taking cinnamon for 1 month now

## 2020-03-16 NOTE — TELEPHONE ENCOUNTER
I do not think that cinnamon is going to do as much as metformin but I can do her blood work now for her    Blood work ordered

## 2020-05-12 ENCOUNTER — TRANSCRIBE ORDERS (OUTPATIENT)
Dept: LAB | Age: 35
End: 2020-05-12

## 2020-05-12 ENCOUNTER — APPOINTMENT (OUTPATIENT)
Dept: LAB | Age: 35
End: 2020-05-12
Attending: INTERNAL MEDICINE
Payer: COMMERCIAL

## 2020-05-12 DIAGNOSIS — L68.0 HIRSUTISM: ICD-10-CM

## 2020-05-12 DIAGNOSIS — E28.2 POLYCYSTIC OVARIAN SYNDROME: Primary | ICD-10-CM

## 2020-05-12 DIAGNOSIS — L70.8 OTHER ACNE: ICD-10-CM

## 2020-05-12 DIAGNOSIS — E28.2 POLYCYSTIC OVARIAN SYNDROME: ICD-10-CM

## 2020-05-12 DIAGNOSIS — E66.09 OTHER OBESITY DUE TO EXCESS CALORIES: ICD-10-CM

## 2020-05-12 LAB — HBA1C MFR BLD HPLC: 5.1 %

## 2020-05-12 PROCEDURE — 30099997 SPECIMEN PROCESSING

## 2020-05-14 ENCOUNTER — TELEPHONE (OUTPATIENT)
Dept: ENDOCRINOLOGY | Facility: HOSPITAL | Age: 35
End: 2020-05-14

## 2020-05-18 LAB — HBA1C MFR BLD HPLC: 4.9 %

## 2020-05-19 ENCOUNTER — APPOINTMENT (OUTPATIENT)
Dept: LAB | Age: 35
End: 2020-05-19
Attending: PSYCHIATRY & NEUROLOGY
Payer: COMMERCIAL

## 2020-05-19 ENCOUNTER — TRANSCRIBE ORDERS (OUTPATIENT)
Dept: LAB | Age: 35
End: 2020-05-19

## 2020-05-19 DIAGNOSIS — Z79.899 OTHER LONG TERM (CURRENT) DRUG THERAPY: ICD-10-CM

## 2020-05-19 DIAGNOSIS — F31.81 BIPOLAR II DISORDER (CMS/HCC): ICD-10-CM

## 2020-05-19 DIAGNOSIS — F31.81 BIPOLAR II DISORDER (CMS/HCC): Primary | ICD-10-CM

## 2020-05-19 PROCEDURE — 30099997 SPECIMEN PROCESSING

## 2020-06-30 ENCOUNTER — APPOINTMENT (OUTPATIENT)
Dept: LAB | Age: 35
End: 2020-06-30
Attending: INTERNAL MEDICINE
Payer: COMMERCIAL

## 2020-06-30 ENCOUNTER — TRANSCRIBE ORDERS (OUTPATIENT)
Dept: LAB | Age: 35
End: 2020-06-30

## 2020-06-30 DIAGNOSIS — L68.0 HIRSUTISM: ICD-10-CM

## 2020-06-30 DIAGNOSIS — L70.8 OTHER ACNE: ICD-10-CM

## 2020-06-30 DIAGNOSIS — E66.09 OTHER OBESITY DUE TO EXCESS CALORIES: ICD-10-CM

## 2020-06-30 DIAGNOSIS — E78.1 PURE HYPERGLYCERIDEMIA: Primary | ICD-10-CM

## 2020-06-30 DIAGNOSIS — E78.1 PURE HYPERGLYCERIDEMIA: ICD-10-CM

## 2020-06-30 LAB — HBA1C MFR BLD HPLC: 5 %

## 2020-06-30 PROCEDURE — 30099997 SPECIMEN PROCESSING

## 2020-07-13 ENCOUNTER — OFFICE VISIT (OUTPATIENT)
Dept: ENDOCRINOLOGY | Facility: HOSPITAL | Age: 35
End: 2020-07-13
Payer: COMMERCIAL

## 2020-07-13 VITALS
HEIGHT: 67 IN | TEMPERATURE: 97.8 F | SYSTOLIC BLOOD PRESSURE: 142 MMHG | BODY MASS INDEX: 38.2 KG/M2 | HEART RATE: 88 BPM | DIASTOLIC BLOOD PRESSURE: 92 MMHG | WEIGHT: 243.4 LBS

## 2020-07-13 DIAGNOSIS — E66.09 CLASS 2 OBESITY DUE TO EXCESS CALORIES WITHOUT SERIOUS COMORBIDITY WITH BODY MASS INDEX (BMI) OF 37.0 TO 37.9 IN ADULT: ICD-10-CM

## 2020-07-13 DIAGNOSIS — L68.0 HIRSUTISM: ICD-10-CM

## 2020-07-13 DIAGNOSIS — N25.1 NEPHROGENIC DIABETES INSIPIDUS (HCC): ICD-10-CM

## 2020-07-13 DIAGNOSIS — E78.1 HYPERTRIGLYCERIDEMIA: ICD-10-CM

## 2020-07-13 DIAGNOSIS — E28.2 PCOS (POLYCYSTIC OVARIAN SYNDROME): Primary | ICD-10-CM

## 2020-07-13 PROCEDURE — 99214 OFFICE O/P EST MOD 30 MIN: CPT | Performed by: INTERNAL MEDICINE

## 2020-07-13 RX ORDER — LORAZEPAM 0.5 MG/1
TABLET ORAL DAILY
COMMUNITY

## 2020-07-13 RX ORDER — CHOLESTYRAMINE 4 G/9G
1 POWDER, FOR SUSPENSION ORAL DAILY
COMMUNITY
End: 2021-09-27 | Stop reason: ALTCHOICE

## 2020-07-13 RX ORDER — METFORMIN HYDROCHLORIDE 500 MG/1
500 TABLET, EXTENDED RELEASE ORAL 2 TIMES DAILY WITH MEALS
Qty: 180 TABLET | Refills: 3 | Status: SHIPPED | OUTPATIENT
Start: 2020-07-13 | End: 2022-02-14

## 2020-07-13 RX ORDER — METHOCARBAMOL 750 MG/1
750 TABLET, FILM COATED ORAL AS NEEDED
COMMUNITY

## 2020-07-13 RX ORDER — FAMOTIDINE 40 MG/1
40 TABLET, FILM COATED ORAL
COMMUNITY

## 2020-07-13 NOTE — PATIENT INSTRUCTIONS
The insulin is still higher than normal    try going up to twice a day metformin and we'll see how the diarrhea does with the [address]  Continue to drink plenty of water for the diabetes insipidus  We'll follow the sodium over time on the lithium along with the thyroid  Follow up in 6 months with blood work

## 2020-07-13 NOTE — PROGRESS NOTES
7/14/2020    Assessment/Plan      Diagnoses and all orders for this visit:    PCOS (polycystic ovarian syndrome)  -     HEMOGLOBIN A1C W/ EAG ESTIMATION Lab Collect; Future  -     Comprehensive metabolic panel Lab Collect; Future  -     TSH, 3rd generation Lab Collect; Future  -     T4, free Lab Collect; Future  -     Insulin, fasting- Lab Collect; Future  -     Lipid Panel with Direct LDL reflex Lab Collect; Future  -     metFORMIN (GLUCOPHAGE-XR) 500 mg 24 hr tablet; Take 1 tablet (500 mg total) by mouth 2 (two) times a day with meals    Hirsutism  -     HEMOGLOBIN A1C W/ EAG ESTIMATION Lab Collect; Future  -     Comprehensive metabolic panel Lab Collect; Future  -     TSH, 3rd generation Lab Collect; Future  -     T4, free Lab Collect; Future  -     Insulin, fasting- Lab Collect; Future  -     Lipid Panel with Direct LDL reflex Lab Collect; Future    Hypertriglyceridemia  -     HEMOGLOBIN A1C W/ EAG ESTIMATION Lab Collect; Future  -     Comprehensive metabolic panel Lab Collect; Future  -     TSH, 3rd generation Lab Collect; Future  -     T4, free Lab Collect; Future  -     Insulin, fasting- Lab Collect; Future  -     Lipid Panel with Direct LDL reflex Lab Collect; Future    Class 2 obesity due to excess calories without serious comorbidity with body mass index (BMI) of 37 0 to 37 9 in adult  -     HEMOGLOBIN A1C W/ EAG ESTIMATION Lab Collect; Future  -     Comprehensive metabolic panel Lab Collect; Future  -     TSH, 3rd generation Lab Collect; Future  -     T4, free Lab Collect; Future  -     Insulin, fasting- Lab Collect; Future  -     Lipid Panel with Direct LDL reflex Lab Collect; Future    Nephrogenic diabetes insipidus ([facility] [address] )    Other orders  -     LORazepam (ATIVAN) 0 5 mg tablet; Take by mouth daily  -     methocarbamol (ROBAXIN) 750 mg tablet; Take 750 mg by mouth as needed for muscle spasms  -     famotidine (PEPCID) 40 MG tablet;  Take 40 mg by mouth daily at bedtime  -     Norethin Ace-Eth Estrad-FE (JUNEL FE 1/20 PO); Take by mouth  -     cholestyramine (QUESTRAN) 4 g packet; Take 1 packet by mouth daily        Assessment/Plan:  1  Polycystic ovary syndrome  Her most recent insulin level is still higher than I would like to see  Since she is tolerating 1 metformin a day, we will try increasing to metformin  mg twice a day  Her Questran will continue the same dosage but can be increased to twice a day as needed  She will continue to work on dietary changes and exercise to try to lose weight  2  Hirsutism  She has no significant terminal hairs at this point in of plucks as needed  No treatment is needed other than observation period  3  Hypertriglyceridemia  Hopefully with improvement of the insulin resistance, her triglycerides will improve  She is also on cholestyramine which is a cholesterol-lowering agent  4  Obesity  She will continue to work on dietary changes and weight loss  Hopefully the metformin will help  5  Nephrogenic diabetes insipidus  Based on symptomatology, it appears she has nephrogenic diabetes insipidus due to Lithium usage  She is unable to decrease her Lithium or discontinue it due to her bipolar disease  At this point, her sodium level is normal at 139 and as long as she can continue to drink plenty of water and keep her sodium level and hydration status normal, no treatment needs to be done other than observation and following her sodium over time  She was informed of this information  I think it would be more dangerous to try to get her off lithium  I have asked her to follow up in 6 months with preceding hemoglobin A1c, fasting insulin level in CMP, lipid panel, TSH, and free T4       CC:  PCOS, obesity, hypertriglyceridemia follow-up    History of Present Illness     HPI: John Gleason is a 29y o  year old female with history of polycystic ovary syndrome with obesity and hirsutism along with hypertriglyceridemia for follow-up visit    She was diagnosed with polycystic ovary syndrome over 5 years ago  She is currently on metformin  mg once daily with breakfast and norethindrone estradiol FE 1/20 daily  She started OCP around 6 months ago per PCP for irregular menses  Blood work done 1 1/2 months ago and then started metformin once a day and repeated blood work  She is fatigued  She thinks her weight has gone up but it is unclear how much and she admits she has not been exercising or dieting with the COVID restrictions  Menstrual cycles were absent for some time and then she started oral contraceptive 6 months ago and menses have been absent on the oral contraceptives but reportedly this is normal for this type of oral contraceptive  Hirsutism is stable  She has no hair loss  She has been tolerating 1 metformin a day with no significant diarrhea as she has started cholestyramine which has helped  She has a history of hirsutism and utilized spironolactone in the past but is not using that now  She has obesity and has been working on diet and exercise along with the spironolactone  She admits she has not been as good with diet exercise recently  She has hypertriglyceridemia  She is on cholestyramine for her diarrhea and IBS  This has helped  She denies chest pain  Psychiatrist thinks lithium induced DI  Craves ice water,thirsty and urinating a lot  She has polyuria and polydipsia and is craving ice water but has no nocturia  Review of Systems   Constitutional: Positive for fatigue and unexpected weight change  Not good with the diet and exercise  No exercise  HENT: Negative for trouble swallowing  Eyes: Negative for visual disturbance  Wears glasses  Respiratory: Positive for shortness of breath  Negative for chest tightness  Slight SOB  Cardiovascular: Negative for chest pain and palpitations  Gastrointestinal: Positive for diarrhea  Negative for abdominal pain, constipation and nausea          Diarrhea stable with cholestyramine  Endocrine: Positive for polydipsia and polyuria  Craving ice water  Nocturia none  Genitourinary:        Menses were absent for some time and started OCP about 6 months ago  Now menses none  Skin: Positive for rash  Hirsutism only a few plucked  The rest are blonde and soft  Has hair loss and using biotin  It is generally thin  She has a rash under right breast and under treatment  Neurological: Negative for dizziness, weakness, light-headedness, numbness and headaches  Psychiatric/Behavioral: Positive for sleep disturbance  Diagnosed with severe sleep apnea dn using CPAP         Historical Information   Past Medical History:   Diagnosis Date    Asthma     childhood    Bipolar 1 disorder (HCC)     Fibromyalgia     Gastric paresis     Gastroparesis     Generalized anxiety disorder     GERD (gastroesophageal reflux disease)     Hypertriglyceridemia     low HDL    IBS (irritable bowel syndrome)     OCD (obsessive compulsive disorder)     Sleep apnea      Past Surgical History:   Procedure Laterality Date    SINUS SURGERY      WISDOM TOOTH EXTRACTION       Social History   Social History     Substance and Sexual Activity   Alcohol Use No     Social History     Substance and Sexual Activity   Drug Use No     Social History     Tobacco Use   Smoking Status Never Smoker   Smokeless Tobacco Never Used     Family History:   Family History   Problem Relation Age of Onset    Depression Mother     Lymphoma Father         NON HODGEKINS       Meds/Allergies   Current Outpatient Medications   Medication Sig Dispense Refill    Biotin 86769 MCG TBDP Take by mouth daily      cholestyramine (QUESTRAN) 4 g packet Take 1 packet by mouth daily      dexlansoprazole (DEXILANT) 60 MG capsule Take 60 mg by mouth      famotidine (PEPCID) 40 MG tablet Take 40 mg by mouth daily at bedtime      lithium carbonate (LITHOBID) 450 mg CR tablet Take 450 mg by mouth 1 in am and 2 in pm      LORazepam (ATIVAN) 0 5 mg tablet Take by mouth daily      LORazepam (ATIVAN) 1 mg tablet Take 1 mg by mouth daily at bedtime       metFORMIN (GLUCOPHAGE-XR) 500 mg 24 hr tablet Take 1 tablet (500 mg total) by mouth 2 (two) times a day with meals 180 tablet 3    methocarbamol (ROBAXIN) 750 mg tablet Take 750 mg by mouth as needed for muscle spasms      Norethin Ace-Eth Estrad-FE (JUNEL FE 1/20 PO) Take by mouth      propranolol (INDERAL) 10 mg tablet Take 10 mg by mouth as needed Prn anxiety disorder      VRAYLAR 6 MG capsule 6 mg daily         No current facility-administered medications for this visit  No Known Allergies    Objective   Vitals: Blood pressure 142/92, pulse 88, temperature 97 8 °F (36 6 °C), height 5' 7" (1 702 m), weight 110 kg (243 lb 6 4 oz)  Invasive Devices     None                 Physical Exam   Constitutional: She is oriented to person, place, and time  She appears well-developed and well-nourished  HENT:   Head: Normocephalic and atraumatic  Eyes: Conjunctivae and EOM are normal    Neck: Normal range of motion  Neck supple  No thyromegaly present  Thyroid normal in size  No carotid bruits  Cardiovascular: Normal rate, regular rhythm, normal heart sounds and intact distal pulses  No murmur heard  Pulmonary/Chest: Effort normal and breath sounds normal  She has no wheezes  Abdominal: Soft  There is no tenderness  Musculoskeletal: She exhibits no edema or deformity  No tremor of the outstretched hands  Lymphadenopathy:     She has no cervical adenopathy  Neurological: She is alert and oriented to person, place, and time  She has normal reflexes  Skin: Skin is warm and dry  No rash noted  No significant hirsutism  No violaceous striae  Vitals reviewed  The history was obtained from the review of the chart and from the patient      Lab Results:   Blood work done on 06/30/2020 showed a CMP with a glucose of 97 but was otherwise normal  Sodium is 139  Fasting insulin level still elevated at 25 7  Total testosterone is 38 with a free testosterone of 4 4  Hemoglobin A1c is 5%  TSH is 3 71          Future Appointments   Date Time Provider Colette Clark   1/25/2021 11:20 AM Huber Nolan, [id number] [facility]

## 2020-07-14 PROBLEM — N25.1 NEPHROGENIC DIABETES INSIPIDUS (HCC): Status: ACTIVE | Noted: 2020-07-14

## 2020-07-22 ENCOUNTER — TELEPHONE (OUTPATIENT)
Dept: ENDOCRINOLOGY | Facility: HOSPITAL | Age: 35
End: 2020-07-22

## 2020-07-22 NOTE — TELEPHONE ENCOUNTER
Received call from patient  She notes on her recent lab work, you told her she does not have diabetes  She is concerned that the metformin would have impacted this result  Is that possible?

## 2020-07-23 NOTE — TELEPHONE ENCOUNTER
The metformin can impact the blood sugars but her hemoglobin A1c is so good at 5 1% that if it impacted at all it would still be in the normal range without metformin

## 2021-01-12 ENCOUNTER — TELEPHONE (OUTPATIENT)
Dept: ENDOCRINOLOGY | Facility: HOSPITAL | Age: 36
End: 2021-01-12

## 2021-01-12 DIAGNOSIS — L65.9 HAIR LOSS: ICD-10-CM

## 2021-01-12 DIAGNOSIS — E28.2 PCOS (POLYCYSTIC OVARIAN SYNDROME): Primary | ICD-10-CM

## 2021-01-12 DIAGNOSIS — N25.1 NEPHROGENIC DIABETES INSIPIDUS (HCC): ICD-10-CM

## 2021-01-12 NOTE — TELEPHONE ENCOUNTER
Pt called to report that she has been having issues with large amounts of hair falling out  She has been taking biotin & multi vitamins for years  She has labs that were ordered 7/13 but she was not going to get them done until before 3/26 appt, even though they were due to be done in December (we rescheduled 1/25 to 3/26)   Please advise

## 2021-01-12 NOTE — TELEPHONE ENCOUNTER
She can get her blood work done now but she will also need a CBC with ferritin level and 25 hydroxy vitamin-D level to evaluate the hair loss

## 2021-01-27 ENCOUNTER — TELEPHONE (OUTPATIENT)
Dept: ENDOCRINOLOGY | Facility: HOSPITAL | Age: 36
End: 2021-01-27

## 2021-01-27 NOTE — TELEPHONE ENCOUNTER
Pt called to advise that she did not get the Vitamin D level checked because her insurance will not cover it  She also got labs done through her PCP and did not use the lab slip from our office  She will have them fax the labs to us

## 2021-03-09 ENCOUNTER — APPOINTMENT (OUTPATIENT)
Dept: LAB | Age: 36
End: 2021-03-09
Attending: INTERNAL MEDICINE
Payer: COMMERCIAL

## 2021-03-09 DIAGNOSIS — E78.1 PURE HYPERGLYCERIDEMIA: ICD-10-CM

## 2021-03-09 DIAGNOSIS — E66.09 OTHER OBESITY DUE TO EXCESS CALORIES: ICD-10-CM

## 2021-03-09 LAB — HBA1C MFR BLD HPLC: 4.8 %

## 2021-03-09 PROCEDURE — 30099997 SPECIMEN PROCESSING

## 2021-03-26 ENCOUNTER — OFFICE VISIT (OUTPATIENT)
Dept: ENDOCRINOLOGY | Facility: HOSPITAL | Age: 36
End: 2021-03-26

## 2021-03-26 VITALS
HEIGHT: 67 IN | DIASTOLIC BLOOD PRESSURE: 70 MMHG | HEART RATE: 80 BPM | WEIGHT: 239.8 LBS | BODY MASS INDEX: 37.64 KG/M2 | SYSTOLIC BLOOD PRESSURE: 110 MMHG

## 2021-03-26 DIAGNOSIS — E78.1 HYPERTRIGLYCERIDEMIA: ICD-10-CM

## 2021-03-26 DIAGNOSIS — N25.1 NEPHROGENIC DIABETES INSIPIDUS (HCC): ICD-10-CM

## 2021-03-26 DIAGNOSIS — E66.09 CLASS 2 OBESITY DUE TO EXCESS CALORIES WITHOUT SERIOUS COMORBIDITY WITH BODY MASS INDEX (BMI) OF 37.0 TO 37.9 IN ADULT: ICD-10-CM

## 2021-03-26 DIAGNOSIS — E28.2 PCOS (POLYCYSTIC OVARIAN SYNDROME): Primary | ICD-10-CM

## 2021-03-26 DIAGNOSIS — L70.8 OTHER ACNE: ICD-10-CM

## 2021-03-26 DIAGNOSIS — E55.9 VITAMIN D DEFICIENCY: ICD-10-CM

## 2021-03-26 PROCEDURE — 99214 OFFICE O/P EST MOD 30 MIN: CPT | Performed by: INTERNAL MEDICINE

## 2021-03-26 RX ORDER — MULTIVIT-MIN/IRON/FOLIC ACID/K 18-600-40
CAPSULE ORAL
Start: 2021-03-26 | End: 2021-03-26 | Stop reason: SDUPTHER

## 2021-03-26 RX ORDER — PEPPERMINT OIL
OIL (ML) MISCELLANEOUS ONCE AS NEEDED
COMMUNITY

## 2021-03-26 RX ORDER — PNV NO.95/FERROUS FUM/FOLIC AC 28MG-0.8MG
1 TABLET ORAL DAILY
COMMUNITY
Start: 2021-02-05

## 2021-03-26 RX ORDER — MULTIVIT-MIN/IRON/FOLIC ACID/K 18-600-40
CAPSULE ORAL
Qty: 100 CAPSULE | Refills: 3 | Status: SHIPPED | OUTPATIENT
Start: 2021-03-26 | End: 2021-09-27 | Stop reason: SDUPTHER

## 2021-03-26 NOTE — PATIENT INSTRUCTIONS
The vitamin D is low  Stat vitamin D 2000 units(IU) daily  The blood sugars are are normal  You do not have diabetes  The insulin level is improved  Continue the same metfromin  Follow up in 6 months with blood work

## 2021-03-26 NOTE — PROGRESS NOTES
3/26/2021    Assessment/Plan      Diagnoses and all orders for this visit:    PCOS (polycystic ovarian syndrome)  -     Comprehensive metabolic panel Lab Collect; Future  -     Vitamin D 25 hydroxy Lab Collect; Future  -     Insulin, fasting- Lab Collect; Future  -     Testosterone, free, total Lab Collect; Future    Other acne  -     Comprehensive metabolic panel Lab Collect; Future  -     Vitamin D 25 hydroxy Lab Collect; Future  -     Insulin, fasting- Lab Collect; Future  -     Testosterone, free, total Lab Collect; Future    Class 2 obesity due to excess calories without serious comorbidity with body mass index (BMI) of 37 0 to 37 9 in adult  -     Comprehensive metabolic panel Lab Collect; Future  -     Vitamin D 25 hydroxy Lab Collect; Future  -     Insulin, fasting- Lab Collect; Future  -     Testosterone, free, total Lab Collect; Future    Hypertriglyceridemia  -     Comprehensive metabolic panel Lab Collect; Future  -     Vitamin D 25 hydroxy Lab Collect; Future  -     Insulin, fasting- Lab Collect; Future  -     Testosterone, free, total Lab Collect; Future    Nephrogenic diabetes insipidus ([address] )  -     Comprehensive metabolic panel Lab Collect; Future  -     Vitamin D 25 hydroxy Lab Collect; Future  -     Insulin, fasting- Lab Collect; Future  -     Testosterone, free, total Lab Collect; Future    Vitamin D deficiency  -     Discontinue: Cholecalciferol (Vitamin D) 50 MCG (2000 UT) CAPS; Take 1 tablet daily  -     Comprehensive metabolic panel Lab Collect; Future  -     Vitamin D 25 hydroxy Lab Collect; Future  -     Insulin, fasting- Lab Collect; Future  -     Testosterone, free, total Lab Collect; Future  -     Cholecalciferol (Vitamin D) 50 MCG (2000 UT) CAPS; Take 1 tablet daily    Other orders  -     Ferrous Sulfate (Iron) 325 (65 Fe) MG TABS; Take 1 tablet by mouth daily  -     peppermint oil; Use once as needed        Assessment/Plan:   1  Polycystic ovary syndrome      Her most recent CMP and insulin level have improved  She is currently only on metformin twice a day for her PCOS and seems to be tolerating it well  She had to stop her oral contraceptives due to elevation in blood pressure  Currently, her menses are occurring on a regular monthly basis  If she needs to restart oral contraceptives, it might be advisable to start an antihypertensive at the same time  She will continue to work on dietary changes and exercise to lose weight  2  Hirsutism  Hirsutism is stable  3  Hypertriglyceridemia  Triglycerides remain elevated but stable  She is working on dietary changes and exercise  4  Nephrogenic diabetes insipidus  This is likely due to lithium  Sodium is stable at 141  She will continue to drink copious amounts of fluid as needed  5  Obesity  Her weight is slowly going down  She will continue to work on dietary changes and exercise  She will continue the same metformin for her PCOS  6  Vitamin-D deficiency  She has vitamin-D deficiency on recent blood work  I have asked her to start vitamin-D 2000 units daily  I have asked her to follow up in 6 months with preceding fasting CMP, insulin level, free and total testosterone level, and 25 hydroxy vitamin-D level  CC:  Polycystic ovary syndrome, hirsutism, obesity, hypertriglyceridemia, nephrogenic DI follow-up    History of Present Illness     HPI: Dwaine Garner is a 28y o  year old female with history of  Polycystic ovary syndrome with obesity and hirsutism along with hypertriglyceridemia , nephrogenic diabetes insipidus for follow-up visit  She was diagnosed with polycystic ovary syndrome over 5 years ago  She is currently taking metformin  milligram twice a day  Stopped OCP as got very high blood pressure  Has gotten regular menses on a monthly basis since then  She admits to polyuria, polydipsia, polyphagia, but no nocturia  She does tend to have a sweet tooth    She denies extremity paresthesia  She has occasional blurry vision  Hirsutism on the right side of her face is unchanged  She was having a lot of hair loss and decided to start biotin  The hair loss does tend to be generalized and there is some regrowth of the hair in the frontotemporal areas bilaterally  She has been getting more cystic acne  She has hirsutism and has utilized spironolactone in the past but is not currently utilizing that  She has hypertriglyceridemia  She was on cholestyramine for her diarrhea and irritable bowel syndrome which has helped the symptoms  She is no longer taking this due to difiult in administration  Using anti diarrheals instead  She has likely nephrogenic diabetes insipidus due to lithium usage  As long as she has a stable sodium, this is just followed over time without any specific treatment  Getting drug rashes over the last year  Working with the dermatologist  Has stopped dexilant and currently rash free, off 1 week  Still some itching  Got first COVID vaccine dose last week  Review of Systems   Constitutional: Positive for fatigue  Negative for unexpected weight change  Weight 4 lbs less than last visit  fatigue worse with worsening fibromyalgia  HENT: Negative for trouble swallowing  Eyes: Positive for visual disturbance  Occasional blurry vision  Respiratory: Negative for chest tightness and shortness of breath  Cardiovascular: Negative for chest pain  Gastrointestinal: Positive for diarrhea  Negative for abdominal pain, constipation and nausea  The nausea and vomiting is gone  Endocrine: Positive for polydipsia, polyphagia and polyuria  Has a lot of thirst and drinks a lot and then urinates a lot  Nocturia none hungry and craves sugar, has a bad sweet tooth  Musculoskeletal: Positive for myalgias  Fibromyalgia worse recently  Can't exercise  Skin: Positive for rash  No dry skin   Hirsutism on the right side without change  Has increase in hair loss, using biotin now  Hair loss is generalized  Some frontotemporal regrowth of hair  Now getting cystic acne, can have up to 4-5 cysts at a time  Neurological: Negative for dizziness, weakness, light-headedness, numbness and headaches  Psychiatric/Behavioral: Negative for dysphoric mood and sleep disturbance  The patient is not nervous/anxious          Historical Information   Past Medical History:   Diagnosis Date    Anemia     Asthma     childhood    Bipolar 1 disorder (HCC)     Fibromyalgia     Gastric paresis     Gastroparesis     Generalized anxiety disorder     GERD (gastroesophageal reflux disease)     Hypertriglyceridemia     low HDL    IBS (irritable bowel syndrome)     OCD (obsessive compulsive disorder)     Sleep apnea      Past Surgical History:   Procedure Laterality Date    SINUS SURGERY      WISDOM TOOTH EXTRACTION       Social History   Social History     Substance and Sexual Activity   Alcohol Use No     Social History     Substance and Sexual Activity   Drug Use No     Social History     Tobacco Use   Smoking Status Never Smoker   Smokeless Tobacco Never Used     Family History:   Family History   Problem Relation Age of Onset    Depression Mother     Lymphoma Father         NON HODGEKINS       Meds/Allergies   Current Outpatient Medications   Medication Sig Dispense Refill    Biotin 94122 MCG TBDP Take by mouth daily      famotidine (PEPCID) 40 MG tablet Take 40 mg by mouth daily at bedtime      Ferrous Sulfate (Iron) 325 (65 Fe) MG TABS Take 1 tablet by mouth daily      lithium carbonate (LITHOBID) 450 mg CR tablet Take 450 mg by mouth 1 in am and 2 in pm      LORazepam (ATIVAN) 0 5 mg tablet Take by mouth daily      LORazepam (ATIVAN) 1 mg tablet Take 1 mg by mouth daily at bedtime       metFORMIN (GLUCOPHAGE-XR) 500 mg 24 hr tablet Take 1 tablet (500 mg total) by mouth 2 (two) times a day with meals 180 tablet 3    methocarbamol (ROBAXIN) 750 mg tablet Take 750 mg by mouth as needed for muscle spasms      peppermint oil Use once as needed      propranolol (INDERAL) 10 mg tablet Take 10 mg by mouth as needed Prn anxiety disorder      VRAYLAR 6 MG capsule 6 mg daily        Cholecalciferol (Vitamin D) 50 MCG (2000 UT) CAPS Take 1 tablet daily 100 capsule 3    cholestyramine (QUESTRAN) 4 g packet Take 1 packet by mouth daily      dexlansoprazole (DEXILANT) 60 MG capsule Take 60 mg by mouth      Norethin Ace-Eth Estrad-FE (JUNEL FE 1/20 PO) Take by mouth       No current facility-administered medications for this visit  No Known Allergies    Objective   Vitals: Blood pressure 110/70, pulse 80, height 5' 7" (1 702 m), weight 109 kg (239 lb 12 8 oz)  Invasive Devices     None                 Physical Exam  Vitals signs reviewed  Constitutional:       Appearance: Normal appearance  She is well-developed  She is obese  HENT:      Head: Normocephalic and atraumatic  Eyes:      Extraocular Movements: Extraocular movements intact  Conjunctiva/sclera: Conjunctivae normal       Comments: No lid lag, stare, proptosis, or periorbital edema  Neck:      Musculoskeletal: Normal range of motion and neck supple  Thyroid: No thyromegaly  Vascular: No carotid bruit  Comments: Tender over the left side of thyroid  Thyroid normal in size without palpable nodules  No supraclavicular fat pad or buffalo hump  Cardiovascular:      Rate and Rhythm: Normal rate and regular rhythm  Heart sounds: Normal heart sounds  No murmur  Pulmonary:      Effort: Pulmonary effort is normal       Breath sounds: Normal breath sounds  No wheezing  Abdominal:      Palpations: Abdomen is soft  Musculoskeletal: Normal range of motion  General: No deformity  Right lower leg: No edema  Left lower leg: No edema  Comments: No tremor of the outstretched hands     Lymphadenopathy:      Cervical: No cervical adenopathy  Skin:     General: Skin is warm and dry  Findings: No rash  Neurological:      Mental Status: She is alert and oriented to person, place, and time  Deep Tendon Reflexes: Reflexes are normal and symmetric  Comments:   Patellar deep tendon reflexes diminished  The history was obtained from the review of the chart and from the patient  Lab Results:       Blood work done at [facility] on 03/09/2021 showed a CMP with a glucose of 85, CO2 19, sodium 141, but was otherwise normal   Corresponding fasting insulin level was 20 5 with normals up to 19 6  Hemoglobin A1c is 4 8%  TSH is 2 68 with a free T4 of 1 2  Total cholesterol 159, triglyceride 244, HDL 40, LDL 85  CBC is normal except for a WBC count of 10 9  Ferritin level is 21  Twenty-five hydroxy vitamin-D level is low at 23          Future Appointments   Date Time Provider Colette Clark   9/27/2021 11:20 AM Dorma Mohs, [address] [facility]

## 2021-04-06 ENCOUNTER — APPOINTMENT (OUTPATIENT)
Dept: LAB | Age: 36
End: 2021-04-06
Attending: PSYCHIATRY & NEUROLOGY
Payer: COMMERCIAL

## 2021-04-06 DIAGNOSIS — F31.81 BIPOLAR II DISORDER (CMS/HCC): ICD-10-CM

## 2021-04-06 DIAGNOSIS — Z79.899 OTHER LONG TERM (CURRENT) DRUG THERAPY: ICD-10-CM

## 2021-04-06 PROCEDURE — 30099997 SPECIMEN PROCESSING

## 2021-07-21 ENCOUNTER — TELEPHONE (OUTPATIENT)
Dept: ENDOCRINOLOGY | Facility: HOSPITAL | Age: 36
End: 2021-07-21

## 2021-07-21 NOTE — TELEPHONE ENCOUNTER
Yes stop the metformin to get the stomach issues resolved and then we can go back on when improved but slowly 1 tablet a day  It may take 2-3 weeks for the stomach to improve so this is ok

## 2021-09-07 NOTE — TELEPHONE ENCOUNTER
Patient left a message  She did go off of the Metformin for about a month and wanted to let us know that last month she did not have a menstrual cycle

## 2021-09-07 NOTE — TELEPHONE ENCOUNTER
Ok, then let's see what happens as she restarts the metformin   IF there is a chance she is pregnant, obviously take a pregnancy test

## 2021-09-14 ENCOUNTER — APPOINTMENT (OUTPATIENT)
Dept: LAB | Age: 36
End: 2021-09-14
Attending: INTERNAL MEDICINE
Payer: COMMERCIAL

## 2021-09-14 DIAGNOSIS — E66.09 OTHER OBESITY DUE TO EXCESS CALORIES: ICD-10-CM

## 2021-09-14 DIAGNOSIS — E55.9 VITAMIN D DEFICIENCY, UNSPECIFIED: ICD-10-CM

## 2021-09-14 DIAGNOSIS — L70.8 OTHER ACNE: ICD-10-CM

## 2021-09-14 DIAGNOSIS — E78.1 PURE HYPERGLYCERIDEMIA: ICD-10-CM

## 2021-09-14 PROCEDURE — 30099997 SPECIMEN PROCESSING

## 2021-09-15 LAB
25(OH)D3 SERPL-MCNC: 27 NG/ML (ref 30–100)
ALBUMIN SERPL-MCNC: 4.8 G/DL (ref 3.6–5.1)
ALBUMIN/GLOB SERPL: 2 (CALC) (ref 1–2.5)
ALP SERPL-CCNC: 51 U/L (ref 31–125)
ALT SERPL-CCNC: 47 U/L (ref 6–29)
AST SERPL-CCNC: 27 U/L (ref 10–30)
BILIRUB SERPL-MCNC: 0.7 MG/DL (ref 0.2–1.2)
BUN SERPL-MCNC: 11 MG/DL (ref 7–25)
BUN/CREAT SERPL: ABNORMAL (CALC) (ref 6–22)
CALCIUM SERPL-MCNC: 9.9 MG/DL (ref 8.6–10.2)
CHLORIDE SERPL-SCNC: 107 MMOL/L (ref 98–110)
CO2 SERPL-SCNC: 23 MMOL/L (ref 20–32)
CREAT SERPL-MCNC: 0.88 MG/DL (ref 0.5–1.1)
GLOBULIN SER CALC-MCNC: 2.4 G/DL (CALC) (ref 1.9–3.7)
GLUCOSE SERPL-MCNC: 93 MG/DL (ref 65–99)
INSULIN SERPL-ACNC: 31.3 UIU/ML
POTASSIUM SERPL-SCNC: 4.1 MMOL/L (ref 3.5–5.3)
PROT SERPL-MCNC: 7.2 G/DL (ref 6.1–8.1)
SL AMB EGFR AFRICAN AMERICAN: 99 ML/MIN/1.73M2
SL AMB EGFR NON AFRICAN AMERICAN: 85 ML/MIN/1.73M2
SODIUM SERPL-SCNC: 138 MMOL/L (ref 135–146)
TESTOST SERPL-MCNC: NORMAL NG/DL

## 2021-09-22 ENCOUNTER — APPOINTMENT (OUTPATIENT)
Dept: LAB | Age: 36
End: 2021-09-22
Attending: PSYCHIATRY & NEUROLOGY
Payer: COMMERCIAL

## 2021-09-22 DIAGNOSIS — F31.81 BIPOLAR II DISORDER (CMS/HCC): ICD-10-CM

## 2021-09-22 DIAGNOSIS — Z79.899 OTHER LONG TERM (CURRENT) DRUG THERAPY: ICD-10-CM

## 2021-09-22 PROCEDURE — 30099997 SPECIMEN PROCESSING

## 2021-09-27 ENCOUNTER — OFFICE VISIT (OUTPATIENT)
Dept: ENDOCRINOLOGY | Facility: HOSPITAL | Age: 36
End: 2021-09-27

## 2021-09-27 VITALS
HEIGHT: 67 IN | BODY MASS INDEX: 39.24 KG/M2 | SYSTOLIC BLOOD PRESSURE: 140 MMHG | DIASTOLIC BLOOD PRESSURE: 82 MMHG | WEIGHT: 250 LBS | HEART RATE: 78 BPM

## 2021-09-27 DIAGNOSIS — E78.1 HYPERTRIGLYCERIDEMIA: ICD-10-CM

## 2021-09-27 DIAGNOSIS — L68.0 HIRSUTISM: ICD-10-CM

## 2021-09-27 DIAGNOSIS — N25.1 NEPHROGENIC DIABETES INSIPIDUS (HCC): ICD-10-CM

## 2021-09-27 DIAGNOSIS — E55.9 VITAMIN D DEFICIENCY: ICD-10-CM

## 2021-09-27 DIAGNOSIS — E66.09 CLASS 2 OBESITY DUE TO EXCESS CALORIES WITHOUT SERIOUS COMORBIDITY WITH BODY MASS INDEX (BMI) OF 37.0 TO 37.9 IN ADULT: ICD-10-CM

## 2021-09-27 DIAGNOSIS — E28.2 PCOS (POLYCYSTIC OVARIAN SYNDROME): Primary | ICD-10-CM

## 2021-09-27 PROCEDURE — 99214 OFFICE O/P EST MOD 30 MIN: CPT | Performed by: INTERNAL MEDICINE

## 2021-09-27 RX ORDER — CHLORAL HYDRATE 500 MG
CAPSULE ORAL
COMMUNITY

## 2021-09-27 RX ORDER — MULTIVIT-MIN/IRON/FOLIC ACID/K 18-600-40
CAPSULE ORAL
Qty: 100 CAPSULE | Refills: 3
Start: 2021-09-27 | End: 2021-09-27 | Stop reason: SDUPTHER

## 2021-09-27 RX ORDER — MULTIVIT-MIN/IRON/FOLIC ACID/K 18-600-40
CAPSULE ORAL
Qty: 200 CAPSULE | Refills: 3 | Status: SHIPPED | OUTPATIENT
Start: 2021-09-27

## 2021-09-27 NOTE — PATIENT INSTRUCTIONS
You can take the metformin at supper, just try to have it taken at a consistent time, within 1-2 hours  Stay  On the once a day for at least another month and then you can try twice  Day metformin again  vitamin d is low, increase to 4000 units daily( 2 2000 units tablets daily)  Work on diet and activity  Follow up in 6 months with blood work

## 2021-09-27 NOTE — PROGRESS NOTES
9/28/2021    Assessment/Plan      Diagnoses and all orders for this visit:    PCOS (polycystic ovarian syndrome)  -     Comprehensive metabolic panel Lab Collect; Future  -     CBC and differential Lab Collect; Future  -     TSH, 3rd generation Lab Collect; Future  -     T4, free Lab Collect; Future  -     Lipid Panel with Direct LDL reflex Lab Collect; Future  -     Testosterone, free, total Lab Collect; Future  -     Insulin, fasting- Lab Collect; Future    Hirsutism  -     Comprehensive metabolic panel Lab Collect; Future  -     CBC and differential Lab Collect; Future  -     TSH, 3rd generation Lab Collect; Future  -     T4, free Lab Collect; Future  -     Lipid Panel with Direct LDL reflex Lab Collect; Future  -     Testosterone, free, total Lab Collect; Future  -     Insulin, fasting- Lab Collect; Future    Class 2 obesity due to excess calories without serious comorbidity with body mass index (BMI) of 37 0 to 37 9 in adult  -     Comprehensive metabolic panel Lab Collect; Future  -     CBC and differential Lab Collect; Future  -     TSH, 3rd generation Lab Collect; Future  -     T4, free Lab Collect; Future  -     Lipid Panel with Direct LDL reflex Lab Collect; Future  -     Testosterone, free, total Lab Collect; Future  -     Insulin, fasting- Lab Collect; Future    Hypertriglyceridemia  -     Comprehensive metabolic panel Lab Collect; Future  -     CBC and differential Lab Collect; Future  -     TSH, 3rd generation Lab Collect; Future  -     T4, free Lab Collect; Future  -     Lipid Panel with Direct LDL reflex Lab Collect; Future  -     Testosterone, free, total Lab Collect; Future  -     Insulin, fasting- Lab Collect; Future    Vitamin D deficiency  -     Discontinue: Cholecalciferol (Vitamin D) 50 MCG (2000 UT) CAPS; Take 2 tablet daily  -     Comprehensive metabolic panel Lab Collect; Future  -     CBC and differential Lab Collect; Future  -     TSH, 3rd generation Lab Collect;  Future  -     T4, free Lab Collect; Future  -     Lipid Panel with Direct LDL reflex Lab Collect; Future  -     Testosterone, free, total Lab Collect; Future  -     Insulin, fasting- Lab Collect; Future  -     Cholecalciferol (Vitamin D) 50 MCG (2000 UT) CAPS; Take 2 tablet daily    Nephrogenic diabetes insipidus ([facility] [address] )  -     Comprehensive metabolic panel Lab Collect; Future  -     CBC and differential Lab Collect; Future  -     TSH, 3rd generation Lab Collect; Future  -     T4, free Lab Collect; Future  -     Lipid Panel with Direct LDL reflex Lab Collect; Future  -     Testosterone, free, total Lab Collect; Future  -     Insulin, fasting- Lab Collect; Future    Other orders  -     Omega-3 1000 MG CAPS; Take by mouth        Assessment/Plan:    1  Polycystic ovarian syndrome  She is now back on metformin as she had a stop it for at least a month and that caused her weight and insulin level to creep up  She so far is tolerating once a day metformin and she can try going up to twice a day in another month as long as her GI tract is stable  She will continue work on dietary changes and exercise  2  Vitamin-D deficiency  Vitamin-D level is low and I have asked her to increase her vitamin-D to 4000 units daily  3  Obesity  Her weight unfortunately creeped up off of metformin for short while  She is working on diet and exercise and is now back on metformin  4  Hypertriglyceridemia  She is working on dietary changes in started a fish oil tablet  I will repeat her lipid profile with her next visit  5  Hirsutism  Hirsutism is stable  6  Nephrogenic diabetes insipidus  This is likely due to her lithium usage  This will be followed over time as she has recently decreased her lithium dosage  I have asked her to follow up in 6 months with preceding CMP, CBC, lipid panel, TSH, free T4, fasting insulin level and free and total testosterone levels        CC:   PCOS, vitamin-D deficiency, triglyceride, obesity follow-up    History of Present Illness     HPI: Mike Galeano is a 28y o  year old female with history of Polycystic ovary syndrome with obesity and hirsutism along with hypertriglyceridemia, nephrogenic diabetes insipidus for follow-up visit  She was diagnosed with polycystic ovary syndrome over 5 years ago and is currently taking metformin  mg once daily  She had to stop her oral contraceptives due to very high blood pressure  She did have to stop metformin in July 2020 for IBS and diarrhea symptoms  She was off for more than a month and restarted metformin in September 2021  She missed a menstrual period in August 2021  Got menses this month, very heavy and long  She has polyuria and polydipsia but no polyphagia  She denies extremity paresthesia or blurry vision  She has hirsutism and has utilized spironolactone in the past    Hirsutism is stable  He has hypertriglyceridemia is working on dietary changes  She is taking fish oil pil daily  She denies chest pain or shortness of breath  She has obesity and utilizes metformin for her PCOS  She has vitamin-D deficiency and takes vitamin-D 2000 units daily  She has nephrogenic diabetes insipidus likely due to lithium  Lithium slightly decreased recently  Review of Systems   Constitutional: Negative for fatigue and unexpected weight change  Weight 11 lb more than March 2021  HENT: Negative for trouble swallowing  Eyes: Negative for visual disturbance  Saw eye doctor last week and vision changed again  Respiratory: Negative for chest tightness and shortness of breath  Cardiovascular: Negative for chest pain  Gastrointestinal: Positive for diarrhea  Negative for abdominal pain, constipation and nausea  Still some loose stools and diarrhea  Endocrine: Positive for polydipsia and polyuria  Negative for polyphagia  Nocturia once a night at times     Genitourinary:        Menses regular monthly typically  Skin: Positive for rash  Still get some rashes at times, uses steroid cream     Neurological: Positive for tremors and headaches  Negative for dizziness, light-headedness and numbness  Some headaches  Occasional hand tremor  Psychiatric/Behavioral: Negative for sleep disturbance         Historical Information   Past Medical History:   Diagnosis Date    Anemia     Asthma     childhood    Bipolar 1 disorder (HCC)     Fibromyalgia     Gastric paresis     Gastroparesis     Generalized anxiety disorder     GERD (gastroesophageal reflux disease)     Hypertriglyceridemia     low HDL    IBS (irritable bowel syndrome)     OCD (obsessive compulsive disorder)     Sleep apnea      Past Surgical History:   Procedure Laterality Date    SINUS SURGERY      WISDOM TOOTH EXTRACTION       Social History   Social History     Substance and Sexual Activity   Alcohol Use No     Social History     Substance and Sexual Activity   Drug Use No     Social History     Tobacco Use   Smoking Status Never Smoker   Smokeless Tobacco Never Used     Family History:   Family History   Problem Relation Age of Onset    Depression Mother     Lymphoma Father         NON HODGEKINS       Meds/Allergies   Current Outpatient Medications   Medication Sig Dispense Refill    Biotin 50920 MCG TBDP Take by mouth daily      Cholecalciferol (Vitamin D) 50 MCG (2000 UT) CAPS Take 2 tablet daily 200 capsule 3    famotidine (PEPCID) 40 MG tablet Take 40 mg by mouth daily at bedtime      Ferrous Sulfate (Iron) 325 (65 Fe) MG TABS Take 1 tablet by mouth daily      lithium carbonate (LITHOBID) 450 mg CR tablet Take 450 mg by mouth 2 in pm      LORazepam (ATIVAN) 0 5 mg tablet Take by mouth daily      LORazepam (ATIVAN) 1 mg tablet Take 1 mg by mouth daily at bedtime       metFORMIN (GLUCOPHAGE-XR) 500 mg 24 hr tablet Take 1 tablet (500 mg total) by mouth 2 (two) times a day with meals (Patient taking differently: Take 500 mg by mouth daily with breakfast ) 180 tablet 3    methocarbamol (ROBAXIN) 750 mg tablet Take 750 mg by mouth as needed for muscle spasms      Omega-3 1000 MG CAPS Take by mouth      peppermint oil Use once as needed      propranolol (INDERAL) 10 mg tablet Take 10 mg by mouth as needed Prn anxiety disorder      VRAYLAR 6 MG capsule 6 mg daily         No current facility-administered medications for this visit  No Known Allergies    Objective   Vitals: Blood pressure 140/82, pulse 78, height 5' 7" (1 702 m), weight 113 kg (250 lb)  Invasive Devices     None                 Physical Exam  Vitals reviewed  Constitutional:       Appearance: Normal appearance  She is well-developed  She is obese  HENT:      Head: Normocephalic and atraumatic  Eyes:      Conjunctiva/sclera: Conjunctivae normal    Neck:      Thyroid: No thyromegaly  Vascular: No carotid bruit  Comments: Thyroid normal in size  No palpable thyroid nodules  Cardiovascular:      Rate and Rhythm: Normal rate and regular rhythm  Heart sounds: Normal heart sounds  No murmur heard  Pulmonary:      Effort: Pulmonary effort is normal       Breath sounds: Normal breath sounds  No wheezing  Abdominal:      Palpations: Abdomen is soft  Musculoskeletal:         General: No deformity  Normal range of motion  Cervical back: Normal range of motion and neck supple  Right lower leg: No edema  Left lower leg: No edema  Comments: No tremor of the outstretched hands  Lymphadenopathy:      Cervical: No cervical adenopathy  Skin:     General: Skin is warm and dry  Findings: No rash  Neurological:      Mental Status: She is alert and oriented to person, place, and time  Deep Tendon Reflexes: Reflexes are normal and symmetric  Comments: Deep tendon reflexes normal          The history was obtained from the review of the chart and from the patient      Lab Results:     Blood work done on 09/15/2021 showed a CMP with a glucose of 93 fasting  Fasting insulin level is  31 3      Twenty-five hydroxy vitamin-D level is 27     Lab Results   Component Value Date    CREATININE 0 88 09/15/2021    CREATININE 0 99 06/11/2019    CREATININE 0 86 12/19/2018    BUN 11 09/15/2021    K 4 1 09/15/2021     09/15/2021    CO2 23 09/15/2021       Lab Results   Component Value Date    HDL 30 (L) 12/19/2018    TRIG 289 (H) 12/19/2018       Lab Results   Component Value Date    ALT 47 (H) 09/15/2021    AST 27 09/15/2021    ALKPHOS 51 09/15/2021       Lab Results   Component Value Date    TSH 2 33 12/19/2018    FREET4 1 2 12/19/2018       Future Appointments   Date Time Provider Colette Clark   4/13/2022 10:20 AM Suresh García MD [facility]

## 2021-11-08 ENCOUNTER — TELEPHONE (OUTPATIENT)
Dept: ENDOCRINOLOGY | Facility: HOSPITAL | Age: 36
End: 2021-11-08

## 2021-12-13 ENCOUNTER — TELEPHONE (OUTPATIENT)
Dept: ENDOCRINOLOGY | Facility: HOSPITAL | Age: 36
End: 2021-12-13

## 2021-12-14 DIAGNOSIS — E28.2 PCOS (POLYCYSTIC OVARIAN SYNDROME): Primary | ICD-10-CM

## 2021-12-14 RX ORDER — MEDROXYPROGESTERONE ACETATE 10 MG/1
TABLET ORAL
Qty: 10 TABLET | Refills: 0 | Status: SHIPPED | OUTPATIENT
Start: 2021-12-14

## 2022-02-12 DIAGNOSIS — E28.2 PCOS (POLYCYSTIC OVARIAN SYNDROME): ICD-10-CM

## 2022-02-14 RX ORDER — METFORMIN HYDROCHLORIDE 500 MG/1
TABLET, EXTENDED RELEASE ORAL
Qty: 180 TABLET | Refills: 0 | Status: SHIPPED | OUTPATIENT
Start: 2022-02-14 | End: 2022-05-10

## 2022-03-08 ENCOUNTER — APPOINTMENT (OUTPATIENT)
Dept: LAB | Age: 37
End: 2022-03-08
Attending: PSYCHIATRY & NEUROLOGY
Payer: COMMERCIAL

## 2022-03-08 DIAGNOSIS — Z79.899 OTHER LONG TERM (CURRENT) DRUG THERAPY: ICD-10-CM

## 2022-03-08 DIAGNOSIS — F31.81 BIPOLAR II DISORDER (CMS/HCC): ICD-10-CM

## 2022-03-08 PROCEDURE — 30099997 SPECIMEN PROCESSING

## 2022-04-14 ENCOUNTER — APPOINTMENT (OUTPATIENT)
Dept: LAB | Age: 37
End: 2022-04-14
Attending: OBSTETRICS & GYNECOLOGY
Payer: COMMERCIAL

## 2022-04-14 DIAGNOSIS — N91.2 AMENORRHEA, UNSPECIFIED: ICD-10-CM

## 2022-04-14 DIAGNOSIS — Z87.42 PERSONAL HISTORY OF OTHER DISEASES OF THE FEMALE GENITAL TRACT: ICD-10-CM

## 2022-04-14 PROCEDURE — 30099997 SPECIMEN PROCESSING

## 2022-05-10 DIAGNOSIS — E28.2 PCOS (POLYCYSTIC OVARIAN SYNDROME): ICD-10-CM

## 2022-05-10 RX ORDER — METFORMIN HYDROCHLORIDE 500 MG/1
TABLET, EXTENDED RELEASE ORAL
Qty: 180 TABLET | Refills: 0 | Status: SHIPPED | OUTPATIENT
Start: 2022-05-10 | End: 2022-08-09

## 2022-08-09 DIAGNOSIS — E28.2 PCOS (POLYCYSTIC OVARIAN SYNDROME): ICD-10-CM

## 2022-08-09 RX ORDER — METFORMIN HYDROCHLORIDE 500 MG/1
TABLET, EXTENDED RELEASE ORAL
Qty: 180 TABLET | Refills: 0 | Status: SHIPPED | OUTPATIENT
Start: 2022-08-09

## 2022-10-03 ENCOUNTER — APPOINTMENT (OUTPATIENT)
Dept: LAB | Age: 37
End: 2022-10-03
Attending: NURSE PRACTITIONER
Payer: COMMERCIAL

## 2022-10-03 DIAGNOSIS — E88.810 METABOLIC SYNDROME: ICD-10-CM

## 2022-10-03 DIAGNOSIS — N92.0 EXCESSIVE AND FREQUENT MENSTRUATION WITH REGULAR CYCLE: ICD-10-CM

## 2022-10-03 DIAGNOSIS — E28.2 POLYCYSTIC OVARIAN SYNDROME: ICD-10-CM

## 2022-10-03 DIAGNOSIS — E78.5 HYPERLIPIDEMIA, UNSPECIFIED: ICD-10-CM

## 2022-10-03 PROCEDURE — 30099997 SPECIMEN PROCESSING

## 2023-01-10 ENCOUNTER — APPOINTMENT (OUTPATIENT)
Dept: LAB | Age: 38
End: 2023-01-10
Attending: OBSTETRICS & GYNECOLOGY
Payer: COMMERCIAL

## 2023-01-10 DIAGNOSIS — R79.89 OTHER SPECIFIED ABNORMAL FINDINGS OF BLOOD CHEMISTRY: ICD-10-CM

## 2023-01-10 PROCEDURE — 30099997 SPECIMEN PROCESSING

## 2023-04-05 ENCOUNTER — TELEPHONE (OUTPATIENT)
Dept: GASTROENTEROLOGY | Facility: CLINIC | Age: 38
End: 2023-04-05

## 2023-04-05 ENCOUNTER — TELEPHONE (OUTPATIENT)
Dept: OTHER | Facility: OTHER | Age: 38
End: 2023-04-05

## 2023-04-05 NOTE — TELEPHONE ENCOUNTER
Patient is calling in to notify the office that she is Je[employer] Witness and she is not able to accept blood transfusion  Please note her chart

## 2023-04-05 NOTE — TELEPHONE ENCOUNTER
Scheduled date of EGD(as of today): 5/9/2023  Physician performing EGD: Dr Goss Case  Location of EGD: [facility]  Instructions reviewed with patient by: Gave pt instructions packet  Clearances: n/a

## 2023-04-24 ENCOUNTER — TELEPHONE (OUTPATIENT)
Dept: GASTROENTEROLOGY | Facility: CLINIC | Age: 38
End: 2023-04-24

## 2023-04-24 NOTE — TELEPHONE ENCOUNTER
Looks like she is scheduled with [facility] with Dr Nitish Padilla on 5/9/2023  Does this go to [employer]?

## 2023-04-24 NOTE — TELEPHONE ENCOUNTER
Patients GI provider:  Dr Ysabel Aden    Number to return call: [phone]    Reason for call: Pt calling because she states someone was supposed to call  Her about her prior auth for her procedure   She has not heard back from anyone    Scheduled procedure/appointment date if applicable: Procedure [place]

## 2023-04-25 ENCOUNTER — PREP FOR PROCEDURE (OUTPATIENT)
Dept: GASTROENTEROLOGY | Facility: CLINIC | Age: 38
End: 2023-04-25

## 2023-04-25 DIAGNOSIS — K21.9 GASTROESOPHAGEAL REFLUX DISEASE WITHOUT ESOPHAGITIS: Primary | ICD-10-CM

## 2023-04-25 NOTE — TELEPHONE ENCOUNTER
Scheduled date of EGD(as of today): 5/9/23  Physician performing EGD: Dr Palmer Snow  Location of EGD: [facility]  Instructions reviewed with patient by: emailed instructions  Clearances: none    Spoke with [facility] and patient does not need prior auth-that was relayed to the patient

## 2023-07-13 ENCOUNTER — TELEPHONE (OUTPATIENT)
Age: 38
End: 2023-07-13

## 2023-07-13 DIAGNOSIS — K58.0 IRRITABLE BOWEL SYNDROME WITH DIARRHEA: Primary | ICD-10-CM

## 2023-07-13 NOTE — TELEPHONE ENCOUNTER
Pt wanted to relay msg to dr Guanaco Bernardo and ask him a question. she stated she started vomiting again so her pcp increased ppi to bid. She now takes pantoprazole 40 mg in the morning and at night. She also had a question. She stated her diarrhea returned and she is not sure when she can take xifaxan again. The amount of bms she has daily alternates from day to day. She stated it depends on the day but she can have up to 6 or 7 loose stools a day. Pt explained diarrhea started a couple weeks ago, on and off.  Pt expressed she has some good days some bad days

## 2023-07-14 NOTE — TELEPHONE ENCOUNTER
Spoke to patient. Patient was calling in to see if she could have xifaxin prescribed again. She was treated with xifaxin about 3 months ago. States that she was also updating Dr. Josep Cunningham that her PCP had changed her PPI regimen. Now takes pantoprazole BID and famotidine at bed time.

## 2023-07-19 ENCOUNTER — TELEPHONE (OUTPATIENT)
Age: 38
End: 2023-07-19

## 2023-08-23 ENCOUNTER — OFFICE VISIT (OUTPATIENT)
Dept: GASTROENTEROLOGY | Facility: CLINIC | Age: 38
End: 2023-08-23
Payer: COMMERCIAL

## 2023-08-23 VITALS
HEIGHT: 68 IN | SYSTOLIC BLOOD PRESSURE: 153 MMHG | BODY MASS INDEX: 39.68 KG/M2 | WEIGHT: 261.8 LBS | DIASTOLIC BLOOD PRESSURE: 88 MMHG

## 2023-08-23 DIAGNOSIS — E66.01 OBESITY, MORBID (HCC): Primary | ICD-10-CM

## 2023-08-23 DIAGNOSIS — K58.0 IRRITABLE BOWEL SYNDROME WITH DIARRHEA: ICD-10-CM

## 2023-08-23 DIAGNOSIS — K21.9 GASTROESOPHAGEAL REFLUX DISEASE WITHOUT ESOPHAGITIS: ICD-10-CM

## 2023-08-23 DIAGNOSIS — K31.84 GASTROPARESIS: ICD-10-CM

## 2023-08-23 DIAGNOSIS — K63.89 SMALL INTESTINAL BACTERIAL OVERGROWTH: ICD-10-CM

## 2023-08-23 PROCEDURE — 99214 OFFICE O/P EST MOD 30 MIN: CPT | Performed by: INTERNAL MEDICINE

## 2023-08-23 RX ORDER — PANTOPRAZOLE SODIUM 40 MG/1
40 TABLET, DELAYED RELEASE ORAL DAILY
Qty: 30 TABLET | Refills: 2 | Status: SHIPPED | OUTPATIENT
Start: 2023-08-23

## 2023-08-23 RX ORDER — DULOXETIN HYDROCHLORIDE 30 MG/1
30 CAPSULE, DELAYED RELEASE ORAL DAILY
COMMUNITY
Start: 2023-08-15

## 2023-08-23 RX ORDER — FAMOTIDINE 40 MG/1
40 TABLET, FILM COATED ORAL
Qty: 90 TABLET | Refills: 5 | Status: SHIPPED | OUTPATIENT
Start: 2023-08-23

## 2023-08-23 NOTE — PROGRESS NOTES
Answers for HPI/ROS submitted by the patient on 8/16/2023  Progression since onset: unchanged  Pain location: epigastric region  Pain - numeric: 4/10  Pain quality: a sensation of fullness  Radiates to: LUQ  anorexia: No  arthralgias: Yes  belching: No  constipation: No  diarrhea: Yes  dysuria: No  fever: No  flatus: Yes  frequency: No  headaches: No  hematochezia: No  hematuria: No  melena: No  myalgias: Yes  nausea:  No  weight loss: No  vomiting: No

## 2023-08-23 NOTE — PROGRESS NOTES
[facility] - Outpatient Follow-up Note  Belinda Page 40 y.o. female MRN: [id number]  Encounter: [id number]    ASSESSMENT AND PLAN:      1. Gastroesophageal reflux disease without esophagitis  Doing well on pantoprazole and famotidine we discussed lifestyle modifications. I think her gastroparesis is playing at least a partial role in her reflux.  - famotidine (PEPCID) 40 MG tablet; Take 1 tablet (40 mg total) by mouth daily at bedtime  Dispense: 90 tablet; Refill: 5  - pantoprazole (PROTONIX) 40 mg tablet; Take 1 tablet (40 mg total) by mouth daily  Dispense: 30 tablet; Refill: 2    2. Obesity, morbid ([address])  Stable unchanged    3. Irritable bowel syndrome with diarrhea  Improved with treatment of her bacterial intestinal overgrowth with rifaximin    4. Gastroparesis  History of this she is on metformin but does not have diabetes she has polycystic ovary disease blood sugar has been good. Her psychiatric meds are contraindication to Reglan she has no nausea observe    5. Small intestinal bacterial overgrowth  History of this has responded to rifaximin in the past      Followup Appointment: 1 year  ______________________________________________________________________    No chief complaint on file. HPI: Patient is a very pleasant 59-year-old female we follow with a history of functional diarrhea acid reflux and gastroparesis. She is also had a previous diagnosis of small intestinal bacterial overgrowth she recently took a course of rifaximin for diarrhea which has helped her a great deal she has no reflux no dysphagia no abdominal pain.     Historical Information   Past Medical History:   Diagnosis Date   • Anemia    • Asthma     childhood   • Bipolar 1 disorder (HCC)    • Fibromyalgia    • Gastric paresis    • Gastroparesis    • Generalized anxiety disorder    • GERD (gastroesophageal reflux disease)    • Hypertriglyceridemia     low HDL   • IBS (irritable bowel syndrome) • OCD (obsessive compulsive disorder)    • Sleep apnea      Past Surgical History:   Procedure Laterality Date   • SINUS SURGERY     • WISDOM TOOTH EXTRACTION       Social History     Substance and Sexual Activity   Alcohol Use No     Social History     Substance and Sexual Activity   Drug Use No     Social History     Tobacco Use   Smoking Status Never   Smokeless Tobacco Never     Family History   Problem Relation Age of Onset   • Depression Mother    • Cancer Father    • Lymphoma Father         NON HODGEKINS   • Colon cancer Neg Hx    • Colon polyps Neg Hx          Current Outpatient Medications:   •  Cholecalciferol (Vitamin D) 50 MCG (2000 UT) CAPS  •  famotidine (PEPCID) 40 MG tablet  •  lithium carbonate (LITHOBID) 450 mg CR tablet  •  lithium carbonate 150 mg capsule  •  LORazepam (ATIVAN) 0.5 mg tablet  •  LORazepam (ATIVAN) 1 mg tablet  •  medroxyPROGESTERone (PROVERA) 10 mg tablet  •  metFORMIN (GLUCOPHAGE-XR) 500 mg 24 hr tablet  •  methocarbamol (ROBAXIN) 750 mg tablet  •  pantoprazole (PROTONIX) 40 mg tablet  •  peppermint oil  •  propranolol (INDERAL) 10 mg tablet  •  VRAYLAR 6 MG capsule  •  Biotin 69055 MCG TBDP  •  DULoxetine (CYMBALTA) 30 mg delayed release capsule  •  Ferrous Sulfate (Iron) 325 (65 Fe) MG TABS  •  gabapentin (NEURONTIN) 100 mg capsule  •  Omega-3 1000 MG CAPS  •  Xifaxan 550 MG tablet  No Known Allergies  Reviewed medications and allergies and updated as indicated    PHYSICAL EXAM:    Blood pressure 153/88, height 5' 7.5" (1.715 m), weight 119 kg (261 lb 12.8 oz). Body mass index is 40.4 kg/m². General Appearance: NAD, cooperative, alert  Eyes: Anicteric, PERRLA, EOMI  ENT:  Normocephalic, atraumatic, normal mucosa. Neck:  Supple, symmetrical, trachea midline  Resp:  Clear to auscultation bilaterally; no rales, rhonchi or wheezing; respirations unlabored   CV:  S1 S2, Regular rate and rhythm; no murmur, rub, or gallop.   GI:  Soft, non-tender, non-distended; normal bowel sounds; no masses, no organomegaly   Rectal: Deferred  Musculoskeletal: No cyanosis, clubbing or edema. Normal ROM. Skin:  No jaundice, rashes, or lesions   Heme/Lymph: No palpable cervical lymphadenopathy  Psych: Normal affect, good eye contact  Neuro: No gross deficits, AAOx3    Lab Results:   No results found for: "WBC", "HGB", "HCT", "MCV", "PLT"  Lab Results   Component Value Date    K 4.1 09/15/2021     09/15/2021    CO2 23 09/15/2021    BUN 11 09/15/2021    CREATININE 0.88 09/15/2021    CALCIUM 9.9 09/15/2021    AST 27 09/15/2021    ALT 47 (H) 09/15/2021    ALKPHOS 51 09/15/2021     No results found for: "IRON", "TIBC", "FERRITIN"  No results found for: "LIPASE"    Radiology Results:   No results found.

## 2023-08-24 DIAGNOSIS — K21.9 GASTROESOPHAGEAL REFLUX DISEASE WITHOUT ESOPHAGITIS: ICD-10-CM

## 2023-08-24 RX ORDER — PANTOPRAZOLE SODIUM 40 MG/1
40 TABLET, DELAYED RELEASE ORAL 2 TIMES DAILY
Qty: 60 TABLET | Refills: 2 | OUTPATIENT
Start: 2023-08-24 | End: 2023-11-22

## 2023-08-24 NOTE — TELEPHONE ENCOUNTER
Dr. Darshana Agustin prescribed the medication when he saw the patient in the office yesterday 8/23. He ordered it as pantoprazole 40 mg daily.

## 2023-08-25 RX ORDER — PANTOPRAZOLE SODIUM 40 MG/1
40 TABLET, DELAYED RELEASE ORAL
Qty: 60 TABLET | Refills: 2 | Status: SHIPPED | OUTPATIENT
Start: 2023-08-25 | End: 2023-11-23

## 2023-08-25 NOTE — TELEPHONE ENCOUNTER
Patient calling in, she received [facility] message declining her new script for pantoprazole. Patient reports Dr. Bella Calderon increased her pantoprazole to 40 mg BID and is asking for a script to be called into her [address] [place] [address].

## 2024-04-15 DIAGNOSIS — K21.9 GASTROESOPHAGEAL REFLUX DISEASE WITHOUT ESOPHAGITIS: ICD-10-CM

## 2024-04-16 RX ORDER — PANTOPRAZOLE SODIUM 40 MG/1
TABLET, DELAYED RELEASE ORAL
Qty: 60 TABLET | Refills: 0 | Status: SHIPPED | OUTPATIENT
Start: 2024-04-16

## 2024-05-13 DIAGNOSIS — K21.9 GASTROESOPHAGEAL REFLUX DISEASE WITHOUT ESOPHAGITIS: ICD-10-CM

## 2024-05-14 RX ORDER — PANTOPRAZOLE SODIUM 40 MG/1
TABLET, DELAYED RELEASE ORAL
Qty: 60 TABLET | Refills: 5 | Status: SHIPPED | OUTPATIENT
Start: 2024-05-14

## 2024-05-22 ENCOUNTER — NURSE TRIAGE (OUTPATIENT)
Age: 39
End: 2024-05-22

## 2024-05-22 NOTE — TELEPHONE ENCOUNTER
Patient calling in today because she is experiencing Vomiting in the morning and nausea tr em out the day. currently taking Pantoprazole 40 mg PO BID and famotide at bedside.         Answer Questions - Initial Assesment - Gerd Recurrent  When did your symptoms start: ongoing    How often are you experiencing symptoms: most morning     When are your symptoms occurring: vomitinng in the morning and nausea through out the day.     Are you currently taking: Gerd meds: PPI: yes and H2 blockers: yes    Have you tried any OTC medications:     Have any OTC medications resolved or lessened your symptoms: no    What recent testing has the patient had:     Are you following the diet and lifestyle modifications: no    Any recent changes in your bowel habits: no    Protocols used: GERD

## 2024-07-24 ENCOUNTER — OFFICE VISIT (OUTPATIENT)
Dept: GASTROENTEROLOGY | Facility: CLINIC | Age: 39
End: 2024-07-24
Payer: COMMERCIAL

## 2024-07-24 VITALS
HEIGHT: 68 IN | DIASTOLIC BLOOD PRESSURE: 78 MMHG | BODY MASS INDEX: 40.56 KG/M2 | SYSTOLIC BLOOD PRESSURE: 118 MMHG | WEIGHT: 267.6 LBS

## 2024-07-24 DIAGNOSIS — K58.0 IRRITABLE BOWEL SYNDROME WITH DIARRHEA: ICD-10-CM

## 2024-07-24 DIAGNOSIS — K63.8219 SMALL INTESTINAL BACTERIAL OVERGROWTH: ICD-10-CM

## 2024-07-24 DIAGNOSIS — E66.01 OBESITY, MORBID (HCC): ICD-10-CM

## 2024-07-24 DIAGNOSIS — K21.9 GASTROESOPHAGEAL REFLUX DISEASE WITHOUT ESOPHAGITIS: ICD-10-CM

## 2024-07-24 DIAGNOSIS — K31.84 GASTROPARESIS: Primary | ICD-10-CM

## 2024-07-24 PROCEDURE — 99214 OFFICE O/P EST MOD 30 MIN: CPT | Performed by: INTERNAL MEDICINE

## 2024-07-24 RX ORDER — LOPERAMIDE HYDROCHLORIDE 2 MG/1
TABLET ORAL
COMMUNITY

## 2024-07-24 RX ORDER — MOMETASONE FUROATE 1 MG/G
CREAM TOPICAL
COMMUNITY

## 2024-07-24 RX ORDER — ACETAMINOPHEN AND CODEINE PHOSPHATE 120; 12 MG/5ML; MG/5ML
SOLUTION ORAL
COMMUNITY
Start: 2024-06-10

## 2024-07-24 RX ORDER — HYDROXYZINE HYDROCHLORIDE 10 MG/1
50 TABLET, FILM COATED ORAL AS NEEDED
COMMUNITY

## 2024-07-24 RX ORDER — LAMOTRIGINE 25 MG/1
25 TABLET ORAL AS NEEDED
COMMUNITY

## 2024-07-24 RX ORDER — NEBIVOLOL 5 MG/1
5 TABLET ORAL DAILY
COMMUNITY
Start: 2024-07-09

## 2024-07-24 RX ORDER — ONDANSETRON 4 MG/1
4 TABLET, FILM COATED ORAL EVERY 8 HOURS PRN
Qty: 20 TABLET | Refills: 0 | Status: SHIPPED | OUTPATIENT
Start: 2024-07-24

## 2024-07-24 NOTE — PROGRESS NOTES
Answers submitted by the patient for this visit:  Abdominal Pain Questionnaire (Submitted on 7/18/2024)  Chief Complaint: Abdominal pain  Chronicity: chronic  Onset: more than 1 year ago  Onset quality: undetermined  Frequency: intermittently  Progression since onset: gradually worsening  Pain location: epigastric region  Pain - numeric: 4/10  Pain quality: a sensation of fullness  anorexia: No  arthralgias: Yes  belching: No  constipation: No  diarrhea: Yes  dysuria: No  fever: No  flatus: Yes  frequency: Yes  headaches: No  hematochezia: No  hematuria: No  melena: No  myalgias: Yes  nausea: Yes  weight loss: No  vomiting: Yes  Aggravated by: vomiting  Relieved by: nothing  [facility] - Outpatient Follow-up Note  Geneva Rosado 38 y.o. female MRN: [id number]  Encounter: [id number]    ASSESSMENT AND PLAN:      1. Gastroparesis  I believe this is her primary problem right now the intermittent nausea it is likely multifactorial some of her psychotropic meds have some anticholinergic effect but she does need them.  I guess there is a small chance this could be biliary.  I have ordered an ultrasound last was done in 2021.  She had an endoscopy a year ago which showed normal esophagus I do not think we need to repeat that right now.  We discussed the importance of small frequent meals not eating late at night I have added Zofran and asked her to move her second dose of pantoprazole to an hour before dinner.  Will reevaluate her in about 3 months.  - US right upper quadrant; Future  - ondansetron (ZOFRAN) 4 mg tablet; Take 1 tablet (4 mg total) by mouth every 8 (eight) hours as needed for nausea or vomiting  Dispense: 20 tablet; Refill: 0    2. Obesity, morbid (HCC)  History of this    3. Gastroesophageal reflux disease without esophagitis  Continue twice daily pantoprazole and at bedtime famotidine    4. Small intestinal bacterial overgrowth  He takes periodic 2-week courses of  Xifaxan for her irritable bowel symptoms which seem to work well.    5. Irritable bowel syndrome with diarrhea  History of this relatively quiescent right now.      Followup Appointment: 3 months  ______________________________________________________________________    Chief Complaint   Patient presents with    Gastroparesis Flare     Discuss med options     HPI: Patient is a very pleasant 38-year-old female we follow with a history of irritable bowel and gastroparesis.  She reports over the last couple months her symptoms of gastroparesis specifically nausea and vomiting have been worse.  He describes feeling sick to her stomach and throwing up a couple times a day.  No heartburn no real abdominal pain but some tingling and numbness under her right rib cage.  She does see a chiropractor for that.  Bowels are occasionally loose but no real significant change.    Historical Information   Past Medical History:   Diagnosis Date    Anemia     Asthma     childhood    Bipolar 1 disorder (HCC)     Fatty liver     Fibromyalgia     Gastric paresis     Gastroparesis     Generalized anxiety disorder     GERD (gastroesophageal reflux disease)     Hypertriglyceridemia     low HDL    IBS (irritable bowel syndrome)     OCD (obsessive compulsive disorder)     Sleep apnea      Past Surgical History:   Procedure Laterality Date    SINUS SURGERY      WISDOM TOOTH EXTRACTION       Social History     Substance and Sexual Activity   Alcohol Use No     Social History     Substance and Sexual Activity   Drug Use No     Social History     Tobacco Use   Smoking Status Never   Smokeless Tobacco Never     Family History   Problem Relation Age of Onset    Depression Mother     Cancer Father     Lymphoma Father         NON HODGEKINS    Early death Father     Diabetes Maternal Grandfather     Diabetes Paternal Grandfather     Cancer Paternal Grandmother     Colon cancer Neg Hx     Colon polyps Neg Hx          Current Outpatient Medications:      "Cholecalciferol (Vitamin D) 50 MCG (2000 UT) CAPS    famotidine (PEPCID) 40 MG tablet    hydrOXYzine HCL (ATARAX) 10 mg tablet    lamoTRIgine (LaMICtal) 25 mg tablet    lithium carbonate (LITHOBID) 450 mg CR tablet    lithium carbonate 150 mg capsule    loperamide (Imodium A-D) 2 MG tablet    LORazepam (ATIVAN) 0.5 mg tablet    LORazepam (ATIVAN) 1 mg tablet    metFORMIN (GLUCOPHAGE-XR) 500 mg 24 hr tablet    methocarbamol (ROBAXIN) 750 mg tablet    mometasone (ELOCON) 0.1 % cream    nebivolol (BYSTOLIC) 5 mg tablet    norethindrone (MICRONOR) 0.35 MG tablet    ondansetron (ZOFRAN) 4 mg tablet    pantoprazole (PROTONIX) 40 mg tablet    peppermint oil    VRAYLAR 6 MG capsule    Xifaxan 550 MG tablet    Biotin 62452 MCG TBDP    DULoxetine (CYMBALTA) 30 mg delayed release capsule    Ferrous Sulfate (Iron) 325 (65 Fe) MG TABS    gabapentin (NEURONTIN) 100 mg capsule    medroxyPROGESTERone (PROVERA) 10 mg tablet    Omega-3 1000 MG CAPS    pantoprazole (PROTONIX) 40 mg tablet    propranolol (INDERAL) 10 mg tablet  Allergies   Allergen Reactions    Amlodipine Lip Swelling, Other (See Comments) and Swelling    Clomipramine Other (See Comments)    Duloxetine Hcl Other (See Comments)     HTN     Reviewed medications and allergies and updated as indicated    PHYSICAL EXAM:    Blood pressure 118/78, height 5' 7.5\" (1.715 m), weight 121 kg (267 lb 9.6 oz). Body mass index is 41.29 kg/m².  General Appearance: NAD, cooperative, alert  Eyes: Anicteric, conjunctiva pink  ENT:  Normocephalic, atraumatic, normal mucosa.    Neck:  Supple, symmetrical, trachea midline  Resp:  Clear to auscultation bilaterally; no rales, rhonchi or wheezing; respirations unlabored   CV:  S1 S2, Regular rate and rhythm; no murmur, rub, or gallop.  GI:  Soft, non-tender, non-distended; normal bowel sounds; no masses, no organomegaly   Rectal: Deferred  Musculoskeletal: No cyanosis, clubbing or edema. Normal ROM.  Skin:  No jaundice, rashes, or lesions " "  Heme/Lymph: No palpable cervical lymphadenopathy  Psych: Normal affect, good eye contact  Neuro: No gross deficits, AAOx3    Lab Results:   No results found for: \"WBC\", \"HGB\", \"HCT\", \"MCV\", \"PLT\"  Lab Results   Component Value Date    K 4.1 09/15/2021     09/15/2021    CO2 23 09/15/2021    BUN 11 09/15/2021    CREATININE 0.88 09/15/2021    CALCIUM 9.9 09/15/2021    AST 27 09/15/2021    ALT 47 (H) 09/15/2021    ALKPHOS 51 09/15/2021       Radiology Results:   No results found.    "

## 2024-08-13 ENCOUNTER — NURSE TRIAGE (OUTPATIENT)
Age: 39
End: 2024-08-13

## 2024-08-13 NOTE — TELEPHONE ENCOUNTER
"SPOKE WITH PT, CALLING FOR U/S RESULTS FROM [employer]. PT ALSO SENT [employer] MESSAGE, SHE IS STILL HAVING VOMITING EPISODES IN THE AM DESPITE ZOFRAN 4 MG Q8H, REQUESTING STRONGER DOSE OR ALTERNATE MEDICATION.         Reason for Disposition   Information only question and nurse able to answer    Answer Assessment - Initial Assessment Questions  1. REASON FOR CALL or QUESTION: \"What is your reason for calling today?\" or \"How can I best help you?\" or \"What question do you have that I can help answer?\"      SPOKE WITH PT, CALLING FOR U/S RESULTS FROM [employer]. PT ALSO SENT [employer] MESSAGE, SHE IS STILL HAVING VOMITING EPISODES IN THE AM DESPITE ZOFRAN 4 MG Q8H, REQUESTING STRONGER DOSE OR ALTERNATE MEDICATION.    Protocols used: Information Only Call - No Triage-ADULT-[place]    "

## 2024-08-15 ENCOUNTER — PATIENT MESSAGE (OUTPATIENT)
Dept: GASTROENTEROLOGY | Facility: CLINIC | Age: 39
End: 2024-08-15

## 2024-08-16 NOTE — TELEPHONE ENCOUNTER
Pt calling back regarding her questions.  Advised that previous messages were sent to provider - will send message again.

## 2024-09-09 DIAGNOSIS — K21.9 GASTROESOPHAGEAL REFLUX DISEASE WITHOUT ESOPHAGITIS: ICD-10-CM

## 2024-09-10 RX ORDER — FAMOTIDINE 40 MG/1
40 TABLET, FILM COATED ORAL
Qty: 90 TABLET | Refills: 1 | Status: SHIPPED | OUTPATIENT
Start: 2024-09-10

## 2024-10-04 DIAGNOSIS — K31.84 GASTROPARESIS: Primary | ICD-10-CM

## 2024-10-04 RX ORDER — ONDANSETRON 8 MG/1
8 TABLET, ORALLY DISINTEGRATING ORAL EVERY 8 HOURS PRN
Qty: 60 TABLET | Refills: 3 | Status: SHIPPED | OUTPATIENT
Start: 2024-10-04

## 2024-10-10 ENCOUNTER — OFFICE VISIT (OUTPATIENT)
Age: 39
End: 2024-10-10
Payer: COMMERCIAL

## 2024-10-10 VITALS
HEIGHT: 68 IN | DIASTOLIC BLOOD PRESSURE: 74 MMHG | BODY MASS INDEX: 38.52 KG/M2 | SYSTOLIC BLOOD PRESSURE: 118 MMHG | WEIGHT: 254.2 LBS

## 2024-10-10 DIAGNOSIS — K31.84 GASTROPARESIS: Primary | ICD-10-CM

## 2024-10-10 DIAGNOSIS — R11.0 NAUSEA: ICD-10-CM

## 2024-10-10 PROCEDURE — 99214 OFFICE O/P EST MOD 30 MIN: CPT | Performed by: NURSE PRACTITIONER

## 2024-10-10 RX ORDER — ESOMEPRAZOLE MAGNESIUM 40 MG/1
40 CAPSULE, DELAYED RELEASE ORAL
Qty: 30 CAPSULE | Refills: 5 | Status: SHIPPED | OUTPATIENT
Start: 2024-10-10

## 2024-10-10 NOTE — PROGRESS NOTES
[facility] - Outpatient Follow-up Note  Geneva Rosado 38 y.o. female MRN: [id number]  Encounter: [id number]    ASSESSMENT AND PLAN:      1. Gastroparesis  2. Nausea and vomiting  Longstanding history of gastroparesis likely due to use of psychiatric meds for bipolar disorder  Recently started on Ozempic for 2 months with significant exacerbation of gastroparesis symptoms.  Now with nausea and vomiting daily, anorexia.  Able to tolerate small amounts of food, symptoms worsen as day progresses  Some relief with Zofran  Patient is requesting change in PPI as she is vomiting bile liquid  Unable to treat with metoclopramide due to multiple psychiatric medications which would interact and increased risk of extrapyramidal side effects  -Stop pantoprazole and prescribed esomeprazole 40 mg daily at breakfast  -Continue famotidine 40 mg at bedtime  -Review gastroparesis diet including smaller more frequent meals, avoid high-fiber fatty foods, avoid carbonated beverages.  Written instructions provided  -Discussed further evaluation at [facility].  Patient is unsure if she wishes to pursue, contact information was provided to the patient      3.  History of SIBO  Periodically treated with Xifaxan with good results  No exacerbations recently    4.  IBS with diarrhea  Symptoms have been fairly well-controlled  Takes occasional Imodium    Followup Appointment: 3 months  ______________________________________________________________________    Chief Complaint   Patient presents with    Gastroparesis     Pt states her gastroparesis symptoms have increased, vomits daily in the morning. Would like to talk about discontinuing pantoprazole and start Dexilant.      HPI: Presents in follow-up for longstanding history of gastroparesis, likely precipitated by multiple psych medications for bipolar disorder    Recently treated with Ozempic for PCOS and insulin resistance-took for 1-1/2 to 2  months however she stopped taking last week due to worsening gastroparesis symptoms  She has lost 15-20 pounds in the past 2 months    She continues to experience daily vomiting, nausea and anorexia  Reports vomiting bile liquid usually in a.m. despite taking pantoprazole daily  She denies GERD symptoms, no dysphagia  She is able to tolerate some foods such as eggs, oatmeal  Takes Zofran 1-2 times per day with some improvement in nausea        Historical Information   Past Medical History:   Diagnosis Date    Anemia     Asthma     childhood    Bipolar 1 disorder (HCC)     Fatty liver     Fibromyalgia     Gastric paresis     Gastroparesis     Generalized anxiety disorder     GERD (gastroesophageal reflux disease)     Hypertriglyceridemia     low HDL    IBS (irritable bowel syndrome)     OCD (obsessive compulsive disorder)     Sleep apnea      Past Surgical History:   Procedure Laterality Date    SINUS SURGERY      UPPER GASTROINTESTINAL ENDOSCOPY  2023    WISDOM TOOTH EXTRACTION       Social History     Substance and Sexual Activity   Alcohol Use No     Social History     Substance and Sexual Activity   Drug Use No     Social History     Tobacco Use   Smoking Status Never    Passive exposure: Never   Smokeless Tobacco Never     Family History   Problem Relation Age of Onset    Depression Mother     Cancer Father     Lymphoma Father         NON HODGEKINS    Early death Father     Diabetes Maternal Grandfather     Diabetes Paternal Grandfather     Cancer Paternal Grandmother     Colon cancer Neg Hx     Colon polyps Neg Hx          Current Outpatient Medications:     Cholecalciferol (Vitamin D) 50 MCG (2000 UT) CAPS    famotidine (PEPCID) 40 MG tablet    hydrOXYzine HCL (ATARAX) 10 mg tablet    lamoTRIgine (LaMICtal) 25 mg tablet    lithium carbonate (LITHOBID) 450 mg CR tablet    lithium carbonate 150 mg capsule    loperamide (Imodium A-D) 2 MG tablet    LORazepam (ATIVAN) 0.5 mg tablet    LORazepam (ATIVAN) 1 mg  "tablet    metFORMIN (GLUCOPHAGE-XR) 500 mg 24 hr tablet    methocarbamol (ROBAXIN) 750 mg tablet    mometasone (ELOCON) 0.1 % cream    nebivolol (BYSTOLIC) 5 mg tablet    norethindrone (MICRONOR) 0.35 MG tablet    ondansetron (ZOFRAN-ODT) 8 mg disintegrating tablet    pantoprazole (PROTONIX) 40 mg tablet    peppermint oil    VRAYLAR 6 MG capsule    Xifaxan 550 MG tablet    Biotin 67566 MCG TBDP    DULoxetine (CYMBALTA) 30 mg delayed release capsule    Ferrous Sulfate (Iron) 325 (65 Fe) MG TABS    gabapentin (NEURONTIN) 100 mg capsule    medroxyPROGESTERone (PROVERA) 10 mg tablet    Omega-3 1000 MG CAPS    pantoprazole (PROTONIX) 40 mg tablet    propranolol (INDERAL) 10 mg tablet  Allergies   Allergen Reactions    Amlodipine Lip Swelling, Other (See Comments) and Swelling    Clomipramine Other (See Comments)    Duloxetine Hcl Other (See Comments)     HTN     Reviewed medications and allergies and updated as indicated    PHYSICAL EXAM:    Blood pressure 118/74, height 5' 7.5\" (1.715 m), weight 115 kg (254 lb 3.2 oz). Body mass index is 39.23 kg/m².  General Appearance: NAD, cooperative, alert  Eyes: Anicteric  ENT:  Normocephalic, atraumatic, normal mucosa.    Neck:  Supple, symmetrical, trachea midline  Resp:  Clear to auscultation bilaterally; no rales, rhonchi or wheezing; respirations unlabored   CV:  S1 S2, Regular rate and rhythm; no murmur, rub, or gallop.  GI:  Soft, non-tender, non-distended; normal bowel sounds; no masses, no organomegaly   Rectal: Deferred  Musculoskeletal: No cyanosis, clubbing or edema. Normal ROM.  Skin:  No jaundice, rashes, or lesions   Psych: Normal affect, good eye contact  Neuro: No gross deficits, AAOx3      Lab Results   Component Value Date    K 4.1 09/15/2021     09/15/2021    CO2 23 09/15/2021    BUN 11 09/15/2021    CREATININE 0.88 09/15/2021    CALCIUM 9.9 09/15/2021    AST 27 09/15/2021    ALT 47 (H) 09/15/2021    ALKPHOS 51 09/15/2021       " No

## 2025-02-03 LAB — HBA1C MFR BLD HPLC: 5.4 %

## 2025-03-06 ENCOUNTER — OFFICE VISIT (OUTPATIENT)
Dept: GASTROENTEROLOGY | Facility: CLINIC | Age: 40
End: 2025-03-06
Payer: COMMERCIAL

## 2025-03-06 ENCOUNTER — TELEPHONE (OUTPATIENT)
Age: 40
End: 2025-03-06

## 2025-03-06 VITALS
SYSTOLIC BLOOD PRESSURE: 120 MMHG | HEIGHT: 68 IN | BODY MASS INDEX: 38.49 KG/M2 | DIASTOLIC BLOOD PRESSURE: 75 MMHG | WEIGHT: 254 LBS

## 2025-03-06 DIAGNOSIS — K76.0 FATTY LIVER: ICD-10-CM

## 2025-03-06 DIAGNOSIS — K58.0 IRRITABLE BOWEL SYNDROME WITH DIARRHEA: ICD-10-CM

## 2025-03-06 DIAGNOSIS — K63.8219 SMALL INTESTINAL BACTERIAL OVERGROWTH: ICD-10-CM

## 2025-03-06 DIAGNOSIS — K31.84 GASTROPARESIS: Primary | ICD-10-CM

## 2025-03-06 DIAGNOSIS — K21.9 GASTROESOPHAGEAL REFLUX DISEASE WITHOUT ESOPHAGITIS: ICD-10-CM

## 2025-03-06 DIAGNOSIS — E66.01 OBESITY, MORBID (HCC): ICD-10-CM

## 2025-03-06 PROCEDURE — 99214 OFFICE O/P EST MOD 30 MIN: CPT | Performed by: PHYSICIAN ASSISTANT

## 2025-03-06 PROCEDURE — G2211 COMPLEX E/M VISIT ADD ON: HCPCS | Performed by: PHYSICIAN ASSISTANT

## 2025-03-06 RX ORDER — ORAL SEMAGLUTIDE 7 MG/1
1 TABLET ORAL DAILY
COMMUNITY
Start: 2025-02-23

## 2025-03-06 RX ORDER — ESOMEPRAZOLE MAGNESIUM 40 MG/1
40 CAPSULE, DELAYED RELEASE ORAL
Qty: 90 CAPSULE | Refills: 3 | Status: SHIPPED | OUTPATIENT
Start: 2025-03-06

## 2025-03-06 RX ORDER — FAMOTIDINE 40 MG/1
40 TABLET, FILM COATED ORAL
Qty: 90 TABLET | Refills: 3 | Status: SHIPPED | OUTPATIENT
Start: 2025-03-06

## 2025-03-06 RX ORDER — ESOMEPRAZOLE MAGNESIUM 40 MG/1
40 CAPSULE, DELAYED RELEASE ORAL
Qty: 90 CAPSULE | Refills: 3 | Status: SHIPPED | OUTPATIENT
Start: 2025-03-06 | End: 2025-03-06

## 2025-03-06 RX ORDER — DROSPIRENONE 4 MG/1
1 TABLET, FILM COATED ORAL DAILY
COMMUNITY
Start: 2024-12-18

## 2025-03-06 NOTE — TELEPHONE ENCOUNTER
PA for Xifaxan 550 mg    SUBMITTED to [employer] [facility]    via      [x]Occlutech-Case ID #   [id number]:[facility] CaliforniaPhone:[phone]:[phone]     All office notes, labs and other pertaining documents and studies sent. Clinical questions answered. Awaiting determination from insurance company.     Turnaround time for your insurance to make a decision on your Prior Authorization can take 7-21 business days.

## 2025-03-06 NOTE — ASSESSMENT & PLAN NOTE
Encouraged continued weight loss with diet and exercise as tolerated., oral semaglutide per prescriber.      Follow up: 6m OV

## 2025-03-06 NOTE — PROGRESS NOTES
Name: Geneva Rosado      : 1985      MRN: [id number]  Encounter Provider: Mary Kate Fabian PA-C  Encounter Date: 3/6/2025   Encounter department: [facility]  :  Assessment & Plan  Gastroparesis  Patient with longstanding gastroparesis with mild delayed gastric emptying at 3 to 4 hours on GES 2018.  This is thought to be due to psyciatric medications and unable to give Reglan given interaction with medications and extrapyramidal side effects.  Was started on Ozempic and had daily vomiting in October , Pantoprazole was changed to esomeprazole 40 and Ozempic switched to oral semaglutide.  I am happy to hear patient much improved.  The daily vomiting is now only occurring once every 1 to 2 weeks on as omeprazole 40 daily and Pepcid 20 at bedtime.  She has intermittent nausea improved with Zofran and peppermint oil as needed.  Reflux feels controlled with this regiment.  Antireflux regiment was reviewed.  Advised to do small frequent low-fat low fiber meals and blenderized foods if needed.  Patient does not wish to see [place] [facility] which is reasonable as symptoms improved.  She knows to call with new or worsening issues.  Orders:    esomeprazole (NexIUM) 40 MG capsule; Take 1 capsule (40 mg total) by mouth daily before breakfast    Gastroesophageal reflux disease without esophagitis  As above  Orders:    famotidine (PEPCID) 40 MG tablet; Take 1 tablet (40 mg total) by mouth daily at bedtime    esomeprazole (NexIUM) 40 MG capsule; Take 1 capsule (40 mg total) by mouth daily before breakfast  -zofran orn  -peppermint oil prn  Irritable bowel syndrome with diarrhea  Reports previously followed with [facility].  Has had chronic diarrhea and SIBO on breath test in past.  Reports stool studies in past negative.  Never had colonoscopy but symptoms respond to Imodium as needed and intermittent courses of Xifaxan.  Was recently treated for BV with Flagyl.  In the last month  diarrhea and flatulence worsening and patient feels IBS-D and SIBO are flaring and would like another course of Xifaxan.  I did recommend we check C. difficile to ensure no underlying infection prior to treating but patient declined and states she is unable to submit stool test given her mental state.  Explained risk of untreated C diff infection including toxic megacolon/death and she expressed her understanding.  If she develops fever abdominal pain worsening diarrhea not responding to Xifaxan and Imodium advised to contact office or go to ER. Can also consider colonoscopy with biopsies in future when able to tolerate prep but patient currently declined. An antidiarrheal diet was reviewed.  She uses Imodium as needed with control.  A diet for reducing gas was reviewed.  Can try Gas-X as needed.  Advised to be up-to-date on GYN exam.  Orders:    rifaximin (XIFAXAN) 550 mg tablet; Take 1 tablet (550 mg total) by mouth 3 (three) times a day for 14 days  -cont Imodium 2mg q4 prn  Small intestinal bacterial overgrowth  as above  Fatty liver  Pt with fatty liver.  LFTs normal. Consider elastography in future. If not immune pt should have HAV and HBV vaccinations. The pathogenesis of fatty liver and potential progression to cirrhosis was reviewed with the patient.  Pt was advised to lose weight, avoid alcohol, live a liver healthy lifestyle and control any comorbidities.          Obesity, morbid (HCC)  Encouraged continued weight loss with diet and exercise as tolerated., oral semaglutide per prescriber.      Follow up: 6m OV      History of Present Illness   HPI  Geneva Rosado is a 39 y.o. female with PMH bipolar disorder, PCOS, obesity on Semaglutide,  Fibromyalgia, OCD, sleep apnea being evaluated in follow-up for gastroparesis, GERD, SIBO, IBS-D.  Denies ever having colonoscopy.  No family h/o GI malignancy.  Patient reports IBS-D diagnosed at [facility] after stool studies negative for infection.    EGD 5/2023  "showed mild gastritis, biopsy negative.     11/2018 GES mildly delayed at 3 and 4 hours.  8/2024 RUQ US showed hepatic steatosis.    12/2024 CMP showed creatinine 0.95 GFR 78  2/2025 TSH normal.    Last saw IGLESIA Goldman October 2024 longstanding gastroparesis likely due to psychiatric medications, on Ozempic with exacerbation and gastroparesis symptoms daily nausea vomiting poor appetite some improvement with Zofran, unable to give Reglan, pantoprazole switched to esomeprazole advised to continue famotidine 40 at bedtime, given contact information for [place] [facility]. Pt with IBS-D controlled with Imodium prn, history of SIBO treated with Xifaxan with good results.    Patient reports Ozempic was switched to oral semaglutide.  Also stopped pantoprazole and has been taking esomeprazole 40 in the morning and continuing Pepcid 40 mg at bedtime.  I am happy to hear that the gastroparesis symptoms are much improved.  She only vomits some bile once every 1 to 2 weeks (was daily 11/2024).  Has intermittent nausea and ends up taking Zofran 1-2 times a month, peppermint oil a few times a week with improvement.  States she has been having more frequent loose stools with urgency and occasional accidents/flatulace  without bleeding.  Takes Imodium 4 mg a few times a week with improvement but would like Xifaxan as this usually gives her 6 to 12 months relief.  Last took Xifaxan 1/2024.  Patient was on Flagyl for a week a month ago for bacterial vaginosis denies sick contacts or travel overseas.  Has lost 15 to 20 pounds last 3 months which she associates with Rybelsus but eating well.  Reflux feels controlled with PPI. She denies dysphagia, abdominal pain alcohol or NSAID use.    Objective   /75   Ht 5' 7.5\" (1.715 m)   Wt 115 kg (254 lb)   BMI 39.19 kg/m²      Physical Exam  Constitutional: Well-developed, no acute distress  HEENT: normocephalic, mucous membranes moist.  Neck: Supple  Skin: warm and " dry  Respiratory: Lungs are clear to auscultation B/L.  Cardiovascular: Heart is regular rate and rhythm.  Gastrointestinal: obese, Soft, mild epigastric tenderness, nondistended with normal active bowel sounds.  No masses, guarding, rebound.   Rectal Exam: Deferred.  Extremities: No edema.  Neurologic: Nonfocal. A & O ×3.   Psychiatric: Normal affect.

## 2025-03-06 NOTE — ASSESSMENT & PLAN NOTE
Patient with longstanding gastroparesis with mild delayed gastric emptying at 3 to 4 hours on GES 2018.  This is thought to be due to psyciatric medications and unable to give Reglan given interaction with medications and extrapyramidal side effects.  Was started on Ozempic and had daily vomiting in October , Pantoprazole was changed to esomeprazole 40 and Ozempic switched to oral semaglutide.  I am happy to hear patient much improved.  The daily vomiting is now only occurring once every 1 to 2 weeks on as omeprazole 40 daily and Pepcid 20 at bedtime.  She has intermittent nausea improved with Zofran and peppermint oil as needed.  Reflux feels controlled with this regiment.  Antireflux regiment was reviewed.  Advised to do small frequent low-fat low fiber meals and blenderized foods if needed.  Patient does not wish to see [place] [facility] which is reasonable as symptoms improved.  She knows to call with new or worsening issues.  Orders:    esomeprazole (NexIUM) 40 MG capsule; Take 1 capsule (40 mg total) by mouth daily before breakfast

## 2025-03-06 NOTE — PATIENT INSTRUCTIONS
-cont omperpazole 40 daily and Pepcid 40 at bedtime  -Continue Zofran peppermint oil and Imodium as needed  -Will treat for SIBO with 2 weeks Xifaxan  -Antidiarrheal diet antireflux regiment  -Gastroparesis diet can blenderized foods if needed  -Fatty liver precautions  6m OV

## 2025-03-07 NOTE — TELEPHONE ENCOUNTER
Xifaxan approved until Authorized from January 1, 2025 to March 20, 2025    Approval letter in media    This medication is a possible high dollar medication. The patient has not been contacted regarding the decision as the office clinical team will handle advising the patient and if/any patient assistance that may have been started.  Thank you.

## 2025-08-05 LAB
ALBUMIN SERPL-MCNC: 5 G/DL (ref 3.6–5.1)
ALBUMIN/GLOB SERPL: 2.1 (CALC) (ref 1–2.5)
ALP SERPL-CCNC: 72 U/L (ref 31–125)
ALT SERPL-CCNC: 23 U/L (ref 6–29)
AST SERPL-CCNC: 17 U/L (ref 10–30)
BASOPHILS # BLD AUTO: 92 CELLS/UL (ref 0–200)
BASOPHILS NFR BLD AUTO: 0.8 %
BILIRUB SERPL-MCNC: 0.5 MG/DL (ref 0.2–1.2)
BUN SERPL-MCNC: 7 MG/DL (ref 7–25)
BUN/CREAT SERPL: NORMAL (CALC) (ref 6–22)
CALCIUM SERPL-MCNC: 10.2 MG/DL (ref 8.6–10.2)
CHLORIDE SERPL-SCNC: 106 MMOL/L (ref 98–110)
CHOLEST SERPL-MCNC: 152 MG/DL
CHOLEST/HDLC SERPL: 4.6 (CALC)
CO2 SERPL-SCNC: 25 MMOL/L (ref 20–32)
CREAT SERPL-MCNC: 0.92 MG/DL (ref 0.5–0.97)
EOSINOPHIL # BLD AUTO: 288 CELLS/UL (ref 15–500)
EOSINOPHIL NFR BLD AUTO: 2.5 %
ERYTHROCYTE [DISTWIDTH] IN BLOOD BY AUTOMATED COUNT: 13.4 % (ref 11–15)
GFR/BSA.PRED SERPLBLD CYS-BASED-ARV: 81 ML/MIN/1.73M2
GLOBULIN SER CALC-MCNC: 2.4 G/DL (CALC) (ref 1.9–3.7)
GLUCOSE SERPL-MCNC: 88 MG/DL (ref 65–99)
HBA1C MFR BLD: 5.1 %
HCT VFR BLD AUTO: 41.6 % (ref 35–45)
HDLC SERPL-MCNC: 33 MG/DL
HGB BLD-MCNC: 14 G/DL (ref 11.7–15.5)
INSULIN SERPL-ACNC: 42.3 UIU/ML
LDLC SERPL CALC-MCNC: 85 MG/DL (CALC)
LYMPHOCYTES # BLD AUTO: 4393 CELLS/UL (ref 850–3900)
LYMPHOCYTES NFR BLD AUTO: 38.2 %
MCH RBC QN AUTO: 30.2 PG (ref 27–33)
MCHC RBC AUTO-ENTMCNC: 33.7 G/DL (ref 32–36)
MCV RBC AUTO: 89.7 FL (ref 80–100)
MONOCYTES # BLD AUTO: 495 CELLS/UL (ref 200–950)
MONOCYTES NFR BLD AUTO: 4.3 %
NEUTROPHILS # BLD AUTO: 6233 CELLS/UL (ref 1500–7800)
NEUTROPHILS NFR BLD AUTO: 54.2 %
NONHDLC SERPL-MCNC: 119 MG/DL (CALC)
PLATELET # BLD AUTO: 361 THOUSAND/UL (ref 140–400)
PMV BLD REES-ECKER: 10 FL (ref 7.5–12.5)
POTASSIUM SERPL-SCNC: 4.2 MMOL/L (ref 3.5–5.3)
PROT SERPL-MCNC: 7.4 G/DL (ref 6.1–8.1)
RBC # BLD AUTO: 4.64 MILLION/UL (ref 3.8–5.1)
SODIUM SERPL-SCNC: 140 MMOL/L (ref 135–146)
TRIGL SERPL-MCNC: 255 MG/DL
TSH SERPL-ACNC: 1.67 MIU/L
WBC # BLD AUTO: 11.5 THOUSAND/UL (ref 3.8–10.8)

## 2025-08-06 PROBLEM — K21.9 GASTROESOPHAGEAL REFLUX DISEASE: Status: ACTIVE | Noted: 2025-08-06

## 2025-08-06 PROBLEM — M79.7 FIBROMYALGIA: Status: ACTIVE | Noted: 2025-08-06

## 2025-08-06 PROBLEM — M17.0 PRIMARY OSTEOARTHRITIS OF BOTH KNEES: Status: ACTIVE | Noted: 2025-08-06

## 2025-08-06 PROBLEM — I10 BENIGN HYPERTENSION: Status: ACTIVE | Noted: 2025-08-06

## 2025-08-06 PROBLEM — M22.42 CHONDROMALACIA PATELLAE, LEFT KNEE: Status: ACTIVE | Noted: 2025-08-06

## 2025-08-06 PROBLEM — G93.32 MYALGIC ENCEPHALOMYELITIS/CHRONIC FATIGUE SYNDROME: Status: ACTIVE | Noted: 2025-08-06

## 2025-08-06 PROBLEM — R73.01 IMPAIRED FASTING GLUCOSE: Status: ACTIVE | Noted: 2025-08-06

## 2025-08-06 PROBLEM — F42.9 OBSESSIVE COMPULSIVE DISORDER: Status: ACTIVE | Noted: 2025-08-06

## 2025-08-06 PROBLEM — F31.81 BIPOLAR II DISORDER (HCC): Status: ACTIVE | Noted: 2025-08-06

## 2025-08-06 PROBLEM — F32.9 MAJOR DEPRESSION: Status: ACTIVE | Noted: 2025-08-06

## 2025-08-13 ENCOUNTER — OFFICE VISIT (OUTPATIENT)
Age: 40
End: 2025-08-13
Payer: COMMERCIAL